# Patient Record
Sex: FEMALE | Race: WHITE | Employment: FULL TIME | URBAN - METROPOLITAN AREA
[De-identification: names, ages, dates, MRNs, and addresses within clinical notes are randomized per-mention and may not be internally consistent; named-entity substitution may affect disease eponyms.]

---

## 2020-04-28 ENCOUNTER — HOSPITAL ENCOUNTER (OUTPATIENT)
Age: 60
Setting detail: SPECIMEN
Discharge: HOME | End: 2020-04-28

## 2020-04-28 PROCEDURE — 86481 TB AG RESPONSE T-CELL SUSP: CPT

## 2020-04-30 LAB
NIL (NEGATIVE) SPOT CONTROL: NORMAL
PANEL A SPOT COUNT: 0
PANEL B SPOT COUNT: 0
POSITIVE CONTROL SPOT COUNT: NORMAL
TB CELL IMMUNE MEASURE: NORMAL

## 2020-09-30 ENCOUNTER — TELEPHONE (OUTPATIENT)
Dept: FAMILY MEDICINE CLINIC | Age: 60
End: 2020-09-30

## 2020-11-23 ENCOUNTER — TELEPHONE (OUTPATIENT)
Dept: PRIMARY CARE CLINIC | Age: 60
End: 2020-11-23

## 2020-11-23 NOTE — TELEPHONE ENCOUNTER
HCTZ 25mg  Triamcinolone cream 5%  Prednisone 20mg  [facility] [address] [place], [place]  Phone: [phone]

## 2021-01-20 RX ORDER — MINOCYCLINE HYDROCHLORIDE 100 MG/1
CAPSULE ORAL
Qty: 30 CAPSULE | Refills: 4 | Status: SHIPPED | OUTPATIENT
Start: 2021-01-20 | End: 2021-06-16

## 2021-02-17 ENCOUNTER — HOSPITAL ENCOUNTER (OUTPATIENT)
Age: 61
Discharge: HOME | End: 2021-02-17
Payer: COMMERCIAL

## 2021-02-17 ENCOUNTER — HOSPITAL ENCOUNTER (OUTPATIENT)
Dept: GENERAL RADIOLOGY | Age: 61
Discharge: HOME | End: 2021-02-17
Payer: COMMERCIAL

## 2021-02-17 DIAGNOSIS — R06.02 SOB (SHORTNESS OF BREATH): ICD-10-CM

## 2021-02-17 PROCEDURE — 71046 X-RAY EXAM CHEST 2 VIEWS: CPT

## 2021-03-01 RX ORDER — TRIAMCINOLONE ACETONIDE 5 MG/G
CREAM TOPICAL
Qty: 15 G | Refills: 2 | Status: SHIPPED | OUTPATIENT
Start: 2021-03-01

## 2021-03-01 RX ORDER — PREDNISONE 20 MG/1
TABLET ORAL
Qty: 30 TABLET | Refills: 0 | Status: SHIPPED | OUTPATIENT
Start: 2021-03-01 | End: 2021-04-26

## 2021-03-05 ENCOUNTER — HOSPITAL ENCOUNTER (OUTPATIENT)
Dept: LAB | Age: 61
Discharge: HOME | End: 2021-03-05
Payer: COMMERCIAL

## 2021-03-05 PROCEDURE — U0002 COVID-19 LAB TEST NON-CDC: HCPCS

## 2021-03-05 PROCEDURE — 36415 COLL VENOUS BLD VENIPUNCTURE: CPT

## 2021-03-06 LAB
SARS-COV-2: NOT DETECTED
SOURCE: NORMAL

## 2021-03-11 ENCOUNTER — HOSPITAL ENCOUNTER (OUTPATIENT)
Dept: PULMONOLOGY | Age: 61
Discharge: HOME | End: 2021-03-11
Payer: COMMERCIAL

## 2021-03-11 LAB
DLCO %PRED: 710 %
DLCO PRED: NORMAL
DLCO/VA %PRED: NORMAL
DLCO/VA PRED: NORMAL
DLCO/VA: NORMAL
DLCO: NORMAL
EXPIRATORY TIME-POST: NORMAL
EXPIRATORY TIME: NORMAL
FEF 25-75% %CHNG: NORMAL
FEF 25-75% %PRED-POST: NORMAL
FEF 25-75% %PRED-PRE: NORMAL
FEF 25-75% PRED: NORMAL
FEF 25-75%-POST: NORMAL
FEF 25-75%-PRE: NORMAL
FEV1 %PRED-POST: 61 %
FEV1 %PRED-PRE: 57 %
FEV1 PRED: NORMAL
FEV1-POST: NORMAL
FEV1-PRE: NORMAL
FEV1/FVC %PRED-POST: NORMAL
FEV1/FVC %PRED-PRE: NORMAL
FEV1/FVC PRED: NORMAL
FEV1/FVC-POST: 99 %
FEV1/FVC-PRE: 95 %
FVC %PRED-POST: NORMAL
FVC %PRED-PRE: NORMAL
FVC PRED: NORMAL
FVC-POST: NORMAL
FVC-PRE: NORMAL
GAW %PRED: NORMAL
GAW PRED: NORMAL
GAW: NORMAL
IC %PRED: NORMAL
IC PRED: NORMAL
IC: NORMAL
MEP: NORMAL
MIP: NORMAL
MVV %PRED-PRE: NORMAL
MVV PRED: NORMAL
MVV-PRE: NORMAL
PEF %PRED-POST: NORMAL
PEF %PRED-PRE: NORMAL
PEF PRED: NORMAL
PEF%CHNG: NORMAL
PEF-POST: NORMAL
PEF-PRE: NORMAL
RAW %PRED: NORMAL
RAW PRED: NORMAL
RAW: NORMAL
RV %PRED: NORMAL
RV PRED: NORMAL
RV: NORMAL
SVC %PRED: NORMAL
SVC PRED: NORMAL
SVC: NORMAL
TLC %PRED: 74 %
TLC PRED: NORMAL
TLC: NORMAL
VA %PRED: NORMAL
VA PRED: NORMAL
VA: NORMAL
VTG %PRED: NORMAL
VTG PRED: NORMAL
VTG: NORMAL

## 2021-03-11 PROCEDURE — 94729 DIFFUSING CAPACITY: CPT

## 2021-03-11 PROCEDURE — 94726 PLETHYSMOGRAPHY LUNG VOLUMES: CPT

## 2021-03-11 PROCEDURE — 94060 EVALUATION OF WHEEZING: CPT

## 2021-03-11 ASSESSMENT — PULMONARY FUNCTION TESTS
FEV1_PERCENT_PREDICTED_POST: 61
FEV1/FVC_PRE: 95
FEV1_PERCENT_PREDICTED_PRE: 57
FEV1/FVC_POST: 99

## 2021-03-19 ENCOUNTER — OFFICE VISIT (OUTPATIENT)
Dept: PRIMARY CARE CLINIC | Age: 61
End: 2021-03-19
Payer: COMMERCIAL

## 2021-03-19 VITALS
SYSTOLIC BLOOD PRESSURE: 122 MMHG | HEART RATE: 63 BPM | DIASTOLIC BLOOD PRESSURE: 80 MMHG | BODY MASS INDEX: 30.83 KG/M2 | WEIGHT: 174 LBS | TEMPERATURE: 97.7 F | OXYGEN SATURATION: 99 % | HEIGHT: 63 IN

## 2021-03-19 DIAGNOSIS — F34.1 DYSTHYMIA: ICD-10-CM

## 2021-03-19 DIAGNOSIS — R06.02 SHORTNESS OF BREATH: Primary | ICD-10-CM

## 2021-03-19 DIAGNOSIS — E03.9 HYPOTHYROIDISM, UNSPECIFIED TYPE: ICD-10-CM

## 2021-03-19 PROCEDURE — G8417 CALC BMI ABV UP PARAM F/U: HCPCS | Performed by: FAMILY MEDICINE

## 2021-03-19 PROCEDURE — 1036F TOBACCO NON-USER: CPT | Performed by: FAMILY MEDICINE

## 2021-03-19 PROCEDURE — G8484 FLU IMMUNIZE NO ADMIN: HCPCS | Performed by: FAMILY MEDICINE

## 2021-03-19 PROCEDURE — 3017F COLORECTAL CA SCREEN DOC REV: CPT | Performed by: FAMILY MEDICINE

## 2021-03-19 PROCEDURE — 99213 OFFICE O/P EST LOW 20 MIN: CPT | Performed by: FAMILY MEDICINE

## 2021-03-19 PROCEDURE — G8427 DOCREV CUR MEDS BY ELIG CLIN: HCPCS | Performed by: FAMILY MEDICINE

## 2021-03-19 RX ORDER — FLUTICASONE PROPIONATE AND SALMETEROL 232; 14 UG/1; UG/1
1 POWDER, METERED RESPIRATORY (INHALATION) DAILY
Qty: 1 EACH | Refills: 5 | Status: SHIPPED | OUTPATIENT
Start: 2021-03-19 | End: 2021-06-04

## 2021-03-19 RX ORDER — MELOXICAM 7.5 MG/1
TABLET ORAL
COMMUNITY

## 2021-03-19 RX ORDER — ACYCLOVIR 200 MG/1
CAPSULE ORAL
COMMUNITY

## 2021-03-19 RX ORDER — HYDROCHLOROTHIAZIDE 25 MG/1
25 TABLET ORAL DAILY
COMMUNITY
End: 2021-06-03 | Stop reason: SDUPTHER

## 2021-03-19 RX ORDER — FLUOXETINE HYDROCHLORIDE 20 MG/1
20 CAPSULE ORAL DAILY
Qty: 90 CAPSULE | Refills: 1 | Status: SHIPPED | OUTPATIENT
Start: 2021-03-19 | End: 2021-08-24

## 2021-03-19 RX ORDER — ESTRADIOL 1 MG/1
TABLET ORAL
COMMUNITY
End: 2021-04-13

## 2021-03-19 RX ORDER — HYDROXYZINE HYDROCHLORIDE 25 MG/1
TABLET, FILM COATED ORAL
COMMUNITY
End: 2021-03-19

## 2021-03-19 RX ORDER — BUDESONIDE AND FORMOTEROL FUMARATE DIHYDRATE 80; 4.5 UG/1; UG/1
AEROSOL RESPIRATORY (INHALATION)
COMMUNITY
End: 2021-03-19

## 2021-03-19 SDOH — HEALTH STABILITY: MENTAL HEALTH: HOW OFTEN DO YOU HAVE A DRINK CONTAINING ALCOHOL?: NOT ASKED

## 2021-03-19 SDOH — HEALTH STABILITY: MENTAL HEALTH: HOW MANY STANDARD DRINKS CONTAINING ALCOHOL DO YOU HAVE ON A TYPICAL DAY?: NOT ASKED

## 2021-03-19 ASSESSMENT — PATIENT HEALTH QUESTIONNAIRE - PHQ9
SUM OF ALL RESPONSES TO PHQ9 QUESTIONS 1 & 2: 0
SUM OF ALL RESPONSES TO PHQ QUESTIONS 1-9: 0
1. LITTLE INTEREST OR PLEASURE IN DOING THINGS: 0

## 2021-03-19 ASSESSMENT — ENCOUNTER SYMPTOMS: SHORTNESS OF BREATH: 1

## 2021-03-19 NOTE — PROGRESS NOTES
Subjective:      Patient ID: Earle Loyd is a 63 y.o. female. Stuck working at home  Does get out to walk  Inhaler too expensive  Lab review      Review of Systems   Constitutional: Positive for unexpected weight change. Respiratory: Positive for shortness of breath. Musculoskeletal:        H/o signif scoliosis   Neurological: Negative. Psychiatric/Behavioral: Negative. All other systems reviewed and are negative. Objective:   Physical Exam    Assessment:      1. Shortness of breath    2. Dysthymia    3. Hypothyroidism, unspecified type            Plan:      Antonio was seen today for other. Diagnoses and all orders for this visit:    Shortness of breath    Dysthymia    Hypothyroidism, unspecified type    Other orders  -     FLUoxetine (PROZAC) 20 MG capsule; Take 1 capsule by mouth daily  -     Fluticasone-Salmeterol 232-14 MCG/ACT AEPB;  Inhale 1 Cartridge into the lungs daily                Electronically signed by Maxx Rosales MD on 3/19/2021 at 1:45 PM

## 2021-04-13 ENCOUNTER — OFFICE VISIT (OUTPATIENT)
Dept: INTERNAL MEDICINE CLINIC | Age: 61
End: 2021-04-13
Payer: COMMERCIAL

## 2021-04-13 VITALS
HEIGHT: 63 IN | OXYGEN SATURATION: 99 % | SYSTOLIC BLOOD PRESSURE: 130 MMHG | HEART RATE: 77 BPM | WEIGHT: 171.2 LBS | DIASTOLIC BLOOD PRESSURE: 80 MMHG | BODY MASS INDEX: 30.33 KG/M2 | TEMPERATURE: 97.2 F

## 2021-04-13 DIAGNOSIS — F41.9 ANXIETY: ICD-10-CM

## 2021-04-13 DIAGNOSIS — E03.9 ACQUIRED HYPOTHYROIDISM: Primary | ICD-10-CM

## 2021-04-13 DIAGNOSIS — J45.20 MILD INTERMITTENT ASTHMA WITHOUT COMPLICATION: ICD-10-CM

## 2021-04-13 DIAGNOSIS — L30.9 CHRONIC DERMATITIS OF HANDS: ICD-10-CM

## 2021-04-13 DIAGNOSIS — I10 ESSENTIAL HYPERTENSION: ICD-10-CM

## 2021-04-13 PROCEDURE — 99203 OFFICE O/P NEW LOW 30 MIN: CPT | Performed by: FAMILY MEDICINE

## 2021-04-13 PROCEDURE — G8427 DOCREV CUR MEDS BY ELIG CLIN: HCPCS | Performed by: FAMILY MEDICINE

## 2021-04-13 PROCEDURE — 3017F COLORECTAL CA SCREEN DOC REV: CPT | Performed by: FAMILY MEDICINE

## 2021-04-13 PROCEDURE — 1036F TOBACCO NON-USER: CPT | Performed by: FAMILY MEDICINE

## 2021-04-13 PROCEDURE — G8417 CALC BMI ABV UP PARAM F/U: HCPCS | Performed by: FAMILY MEDICINE

## 2021-04-13 SDOH — ECONOMIC STABILITY: FOOD INSECURITY: WITHIN THE PAST 12 MONTHS, YOU WORRIED THAT YOUR FOOD WOULD RUN OUT BEFORE YOU GOT MONEY TO BUY MORE.: NOT ASKED

## 2021-04-13 SDOH — ECONOMIC STABILITY: TRANSPORTATION INSECURITY
IN THE PAST 12 MONTHS, HAS THE LACK OF TRANSPORTATION KEPT YOU FROM MEDICAL APPOINTMENTS OR FROM GETTING MEDICATIONS?: NOT ASKED

## 2021-04-13 SDOH — ECONOMIC STABILITY: TRANSPORTATION INSECURITY
IN THE PAST 12 MONTHS, HAS LACK OF TRANSPORTATION KEPT YOU FROM MEETINGS, WORK, OR FROM GETTING THINGS NEEDED FOR DAILY LIVING?: NOT ASKED

## 2021-04-13 ASSESSMENT — ENCOUNTER SYMPTOMS
NAUSEA: 0
COUGH: 0
BACK PAIN: 0
ABDOMINAL PAIN: 0
CONSTIPATION: 0
SHORTNESS OF BREATH: 0
DIARRHEA: 0
EYES NEGATIVE: 1
BLOOD IN STOOL: 0

## 2021-04-13 NOTE — PROGRESS NOTES
Sapphire Garcia  1960  63 y.o.  female    Chief Complaint   Patient presents with    New Patient         History of Present Illness  Sapphire Garcia is a 63 y.o. female who presents today for a new patient visit. Patient Active Problem List    Diagnosis Date Noted    Asthma     Anxiety     Hand dermatitis     Hypertension     Scoliosis     Acquired hypothyroidism      -Hypothyroidism and HTN-Stable  -Anxiety- On Prozac 20 mg.   -Hand dermatitis- Chronic. Pt was told it was related to anxiety, but symptoms do not improve. She admits she picks at the areas on her hands. She was put on Minocycline. Seen dermatology 5 years ago- No biopsy. Removing gluten from diet helped slightly  -Asthma- Follows with pulmonology. She is switching from Symbicort to Advair    Review of Systems   Constitutional: Negative for chills, diaphoresis and fever. HENT: Negative. Eyes: Negative. Respiratory: Negative for cough and shortness of breath. Cardiovascular: Negative for chest pain and palpitations. Gastrointestinal: Negative for abdominal pain, blood in stool, constipation, diarrhea and nausea. Genitourinary: Negative for difficulty urinating and dysuria. Musculoskeletal: Negative for back pain. Skin: Positive for rash. Neurological: Negative for dizziness, light-headedness and headaches. Psychiatric/Behavioral: Negative for dysphoric mood. The patient is nervous/anxious.         Allergies   Allergen Reactions    Black Pepper-Turmeric Shortness Of Breath    Aspirin     Codeine     Fluoride Preparations     Penicillins     Sulfa Antibiotics        Past Medical History:   Diagnosis Date    Acquired hypothyroidism     Anxiety     Arthritis     Asthma     Hand dermatitis     Hypertension     Scoliosis        Past Surgical History:   Procedure Laterality Date    HYSTERECTOMY, TOTAL ABDOMINAL      Still has 1 ovary       Family History   Problem Relation Age of Onset    Rheum Arthritis Mother  COPD Mother     Thyroid Cancer Mother     COPD Father        Social History     Tobacco Use    Smoking status: Never Smoker    Smokeless tobacco: Never Used   Substance Use Topics    Alcohol use: Not Currently    Drug use: Never       Current Outpatient Medications   Medication Sig Dispense Refill    Multiple Vitamin (MULTIVITAMIN ADULT PO) multivitamin   one daily      acyclovir (ZOVIRAX) 200 MG capsule acyclovir 200 mg capsule      Cetirizine HCl 10 MG CAPS cetirizine 10 mg capsule   Take 1 capsule every day by oral route.  hydroCHLOROthiazide (HYDRODIURIL) 25 MG tablet Take 25 mg by mouth daily      meloxicam (MOBIC) 7.5 MG tablet Take by mouth      SYMBICORT 80-4.5 MCG/ACT AERO Inhale 2 puffs into the lungs 2 times daily 1 Inhaler 5    triamcinolone (ARISTOCORT) 0.5 % cream APPLY TO AFFECTED AREA(S) DAILY 15 g 2    predniSONE (DELTASONE) 20 MG tablet TAKE ONE TABLET BY MOUTH DAILY AS NEEDED 30 tablet 0    atenolol (TENORMIN) 25 MG tablet atenolol 25 mg tablet      furosemide (LASIX) 20 MG tablet furosemide 20 mg tablet      triamcinolone (NASACORT ALLERGY 24HR) 55 MCG/ACT nasal inhaler Nasacort 55 mcg nasal spray aerosol   Take 2 sprays every day by nasal route.  montelukast (SINGULAIR) 10 MG tablet Take 10 mg by mouth nightly      albuterol sulfate HFA (VENTOLIN HFA) 108 (90 Base) MCG/ACT inhaler Inhale 2 puffs into the lungs every 6 hours as needed for Wheezing      levothyroxine (SYNTHROID) 50 MCG tablet Take 75 mcg by mouth Daily       minocycline (MINOCIN;DYNACIN) 100 MG capsule TAKE ONE CAPSULE BY MOUTH DAILY 30 capsule 4    FLUoxetine (PROZAC) 20 MG capsule Take 1 capsule by mouth daily 90 capsule 1    Fluticasone-Salmeterol 232-14 MCG/ACT AEPB Inhale 1 Cartridge into the lungs daily (Patient not taking: Reported on 4/13/2021) 1 each 5     No current facility-administered medications for this visit.         OBJECTIVE:    /80   Pulse 77   Temp 97.2 °F (36.2 °C)   Ht 5' 3\" (1.6 m)   Wt 171 lb 3.2 oz (77.7 kg)   SpO2 99%   PF 99 L/min   BMI 30.33 kg/m²     Physical Exam  Vitals signs reviewed. Constitutional:       General: She is not in acute distress. HENT:      Head: Normocephalic and atraumatic. Right Ear: Tympanic membrane normal.      Left Ear: Tympanic membrane normal.   Eyes:      Extraocular Movements: Extraocular movements intact. Pupils: Pupils are equal, round, and reactive to light. Neck:      Musculoskeletal: Neck supple. Cardiovascular:      Rate and Rhythm: Normal rate and regular rhythm. Pulmonary:      Effort: Pulmonary effort is normal. No respiratory distress. Breath sounds: Normal breath sounds. Abdominal:      General: Bowel sounds are normal. There is no distension. Palpations: Abdomen is soft. Tenderness: There is no abdominal tenderness. Musculoskeletal:      Right lower leg: No edema. Left lower leg: No edema. Skin:     Findings: Rash (B/L excoriated areas to dorsum of hands) present. Neurological:      Mental Status: She is alert and oriented to person, place, and time. Cranial Nerves: No cranial nerve deficit. Psychiatric:         Mood and Affect: Mood normal.         ASSESSMENT:  1. Acquired hypothyroidism    2. Chronic dermatitis of hands    3. Essential hypertension    4. Anxiety    5.  Mild intermittent asthma without complication        PLAN:    Orders Placed This Encounter   Procedures    BASIC METABOLIC PANEL    Amb External Referral To Dermatology   Obtain old records/[facility] labs  Obtain lab  Continue medications  Avoid picking at hands  Refer to [address] [place] [address] or call           Return in about 3 months (around 7/13/2021) for Physical.    Electronically Signed by Lisa Ceron DO

## 2021-04-26 RX ORDER — MELOXICAM 15 MG/1
TABLET ORAL
Qty: 30 TABLET | Refills: 4 | Status: SHIPPED | OUTPATIENT
Start: 2021-04-26 | End: 2021-07-13 | Stop reason: SDUPTHER

## 2021-04-26 RX ORDER — PREDNISONE 20 MG/1
TABLET ORAL
Qty: 30 TABLET | Refills: 0 | Status: SHIPPED | OUTPATIENT
Start: 2021-04-26 | End: 2021-06-03 | Stop reason: SDUPTHER

## 2021-04-26 RX ORDER — FUROSEMIDE 20 MG/1
TABLET ORAL
Qty: 30 TABLET | Refills: 4 | Status: SHIPPED | OUTPATIENT
Start: 2021-04-26 | End: 2021-09-29

## 2021-04-26 RX ORDER — ATENOLOL 25 MG/1
TABLET ORAL
Qty: 30 TABLET | Refills: 4 | Status: SHIPPED | OUTPATIENT
Start: 2021-04-26 | End: 2021-09-29

## 2021-04-26 RX ORDER — FLUOXETINE 10 MG/1
CAPSULE ORAL
Qty: 30 CAPSULE | Refills: 9 | Status: SHIPPED | OUTPATIENT
Start: 2021-04-26 | End: 2021-08-24

## 2021-05-07 RX ORDER — MONTELUKAST SODIUM 10 MG/1
TABLET ORAL
Qty: 30 TABLET | Refills: 4 | Status: SHIPPED | OUTPATIENT
Start: 2021-05-07 | End: 2021-10-04

## 2021-06-04 RX ORDER — DILTIAZEM HYDROCHLORIDE 60 MG/1
TABLET, FILM COATED ORAL
Qty: 10.2 G | Refills: 1 | Status: SHIPPED | OUTPATIENT
Start: 2021-06-04 | End: 2021-06-04

## 2021-06-04 RX ORDER — LEVOTHYROXINE SODIUM 0.05 MG/1
75 TABLET ORAL DAILY
Qty: 90 TABLET | Refills: 1 | Status: SHIPPED | OUTPATIENT
Start: 2021-06-04 | End: 2021-09-07 | Stop reason: SDUPTHER

## 2021-06-04 RX ORDER — DILTIAZEM HYDROCHLORIDE 60 MG/1
2 TABLET, FILM COATED ORAL 2 TIMES DAILY
Qty: 1 INHALER | Refills: 5 | Status: SHIPPED | OUTPATIENT
Start: 2021-06-04 | End: 2022-02-01 | Stop reason: SDUPTHER

## 2021-06-04 RX ORDER — HYDROCHLOROTHIAZIDE 25 MG/1
25 TABLET ORAL DAILY
Qty: 90 TABLET | Refills: 1 | Status: SHIPPED | OUTPATIENT
Start: 2021-06-04 | End: 2021-11-08

## 2021-06-04 RX ORDER — LEVOTHYROXINE SODIUM 0.07 MG/1
TABLET ORAL
Qty: 30 TABLET | Refills: 1 | Status: SHIPPED | OUTPATIENT
Start: 2021-06-04 | End: 2021-06-04

## 2021-06-04 RX ORDER — PREDNISONE 20 MG/1
TABLET ORAL
Qty: 30 TABLET | Refills: 0 | Status: SHIPPED | OUTPATIENT
Start: 2021-06-04 | End: 2021-07-13

## 2021-06-04 RX ORDER — ALBUTEROL SULFATE 90 UG/1
2 AEROSOL, METERED RESPIRATORY (INHALATION) EVERY 6 HOURS PRN
Qty: 1 INHALER | Refills: 5 | Status: SHIPPED | OUTPATIENT
Start: 2021-06-04

## 2021-06-14 ENCOUNTER — TELEPHONE (OUTPATIENT)
Dept: PRIMARY CARE CLINIC | Age: 61
End: 2021-06-14

## 2021-06-14 NOTE — TELEPHONE ENCOUNTER
STEPHANY CALLING BECAUSE NAEL WAS ONLY PRESCRIBED 90 TABS OF HER THYROID MEDICATION WHEN SHE TAKES 1.5 TAB DAILY.  QUANTITY  WAS ADJUSTED

## 2021-06-16 RX ORDER — MINOCYCLINE HYDROCHLORIDE 100 MG/1
CAPSULE ORAL
Qty: 30 CAPSULE | Refills: 3 | Status: SHIPPED | OUTPATIENT
Start: 2021-06-16 | End: 2021-10-19 | Stop reason: SDUPTHER

## 2021-06-30 ENCOUNTER — TELEPHONE (OUTPATIENT)
Dept: PRIMARY CARE CLINIC | Age: 61
End: 2021-06-30

## 2021-06-30 NOTE — TELEPHONE ENCOUNTER
Pt returned called Dr Jaleel Guzman is PCP but has another Dr that takes care of her mammograms for her just told her to have them fax it to us.

## 2021-07-13 RX ORDER — MELOXICAM 15 MG/1
TABLET ORAL
Qty: 30 TABLET | Refills: 5 | Status: SHIPPED | OUTPATIENT
Start: 2021-07-13

## 2021-07-13 RX ORDER — PREDNISONE 20 MG/1
TABLET ORAL
Qty: 30 TABLET | Refills: 0 | Status: SHIPPED | OUTPATIENT
Start: 2021-07-13 | End: 2021-10-06

## 2021-08-03 ENCOUNTER — TELEPHONE (OUTPATIENT)
Dept: PRIMARY CARE CLINIC | Age: 61
End: 2021-08-03

## 2021-08-03 RX ORDER — VALACYCLOVIR HYDROCHLORIDE 500 MG/1
500 TABLET, FILM COATED ORAL 2 TIMES DAILY
COMMUNITY
End: 2021-09-07 | Stop reason: SDUPTHER

## 2021-08-24 RX ORDER — FLUOXETINE HYDROCHLORIDE 20 MG/1
CAPSULE ORAL
Qty: 90 CAPSULE | Refills: 1 | Status: SHIPPED | OUTPATIENT
Start: 2021-08-24 | End: 2022-01-27 | Stop reason: SDUPTHER

## 2021-09-07 ENCOUNTER — OFFICE VISIT (OUTPATIENT)
Dept: PRIMARY CARE CLINIC | Age: 61
End: 2021-09-07
Payer: COMMERCIAL

## 2021-09-07 ENCOUNTER — HOSPITAL ENCOUNTER (OUTPATIENT)
Age: 61
Setting detail: SPECIMEN
Discharge: HOME | End: 2021-09-07
Payer: COMMERCIAL

## 2021-09-07 VITALS
SYSTOLIC BLOOD PRESSURE: 130 MMHG | DIASTOLIC BLOOD PRESSURE: 85 MMHG | OXYGEN SATURATION: 98 % | BODY MASS INDEX: 30.76 KG/M2 | HEART RATE: 72 BPM | WEIGHT: 173.6 LBS | HEIGHT: 63 IN

## 2021-09-07 DIAGNOSIS — R53.83 FATIGUE, UNSPECIFIED TYPE: ICD-10-CM

## 2021-09-07 DIAGNOSIS — R13.10 DYSPHAGIA, UNSPECIFIED TYPE: Primary | ICD-10-CM

## 2021-09-07 DIAGNOSIS — Z12.31 ENCOUNTER FOR SCREENING MAMMOGRAM FOR MALIGNANT NEOPLASM OF BREAST: ICD-10-CM

## 2021-09-07 DIAGNOSIS — I10 ESSENTIAL HYPERTENSION: ICD-10-CM

## 2021-09-07 LAB
ABSOLUTE EOS #: 0.23 K/UL (ref 0–0.44)
ABSOLUTE IMMATURE GRANULOCYTE: <0.03 K/UL (ref 0–0.3)
ABSOLUTE LYMPH #: 2.09 K/UL (ref 1.1–3.7)
ABSOLUTE MONO #: 0.53 K/UL (ref 0.1–1.2)
ANION GAP SERPL CALCULATED.3IONS-SCNC: 16 MMOL/L (ref 9–17)
BASOPHILS # BLD: 1 % (ref 0–2)
BASOPHILS ABSOLUTE: 0.04 K/UL (ref 0–0.2)
BUN BLDV-MCNC: 20 MG/DL (ref 6–20)
CHLORIDE BLD-SCNC: 99 MMOL/L (ref 98–107)
CHOLESTEROL/HDL RATIO: 5.8
CHOLESTEROL: 305 MG/DL
CO2: 24 MMOL/L (ref 20–31)
CREAT SERPL-MCNC: 1 MG/DL (ref 0.5–0.9)
DIFFERENTIAL TYPE: NORMAL
EOSINOPHILS RELATIVE PERCENT: 3 % (ref 1–4)
GFR AFRICAN AMERICAN: >60 ML/MIN
GFR NON-AFRICAN AMERICAN: 57 ML/MIN
GFR SERPL CREATININE-BSD FRML MDRD: ABNORMAL ML/MIN/{1.73_M2}
GFR SERPL CREATININE-BSD FRML MDRD: ABNORMAL ML/MIN/{1.73_M2}
HCT VFR BLD CALC: 46 % (ref 36.3–47.1)
HDLC SERPL-MCNC: 53 MG/DL
HEMOGLOBIN: 14.2 G/DL (ref 11.9–15.1)
IMMATURE GRANULOCYTES: 0 %
LDL CHOLESTEROL: ABNORMAL MG/DL (ref 0–130)
LYMPHOCYTES # BLD: 28 % (ref 24–43)
MCH RBC QN AUTO: 28.3 PG (ref 25.2–33.5)
MCHC RBC AUTO-ENTMCNC: 30.9 G/DL (ref 28.4–34.8)
MCV RBC AUTO: 91.8 FL (ref 82.6–102.9)
MONOCYTES # BLD: 7 % (ref 3–12)
NRBC AUTOMATED: 0 PER 100 WBC
PDW BLD-RTO: 13.9 % (ref 11.8–14.4)
PLATELET # BLD: 388 K/UL (ref 138–453)
PLATELET ESTIMATE: NORMAL
PMV BLD AUTO: 9.8 FL (ref 8.1–13.5)
POTASSIUM SERPL-SCNC: 4.5 MMOL/L (ref 3.7–5.3)
RBC # BLD: 5.01 M/UL (ref 3.95–5.11)
RBC # BLD: NORMAL 10*6/UL
SEG NEUTROPHILS: 61 % (ref 36–65)
SEGMENTED NEUTROPHILS ABSOLUTE COUNT: 4.64 K/UL (ref 1.5–8.1)
SODIUM BLD-SCNC: 139 MMOL/L (ref 135–144)
TRIGL SERPL-MCNC: 402 MG/DL
TSH SERPL DL<=0.05 MIU/L-ACNC: 1.98 MIU/L (ref 0.3–5)
VLDLC SERPL CALC-MCNC: ABNORMAL MG/DL (ref 1–30)
WBC # BLD: 7.6 K/UL (ref 3.5–11.3)
WBC # BLD: NORMAL 10*3/UL

## 2021-09-07 PROCEDURE — G8427 DOCREV CUR MEDS BY ELIG CLIN: HCPCS | Performed by: FAMILY MEDICINE

## 2021-09-07 PROCEDURE — 99214 OFFICE O/P EST MOD 30 MIN: CPT | Performed by: FAMILY MEDICINE

## 2021-09-07 PROCEDURE — G8417 CALC BMI ABV UP PARAM F/U: HCPCS | Performed by: FAMILY MEDICINE

## 2021-09-07 PROCEDURE — 3017F COLORECTAL CA SCREEN DOC REV: CPT | Performed by: FAMILY MEDICINE

## 2021-09-07 PROCEDURE — 1036F TOBACCO NON-USER: CPT | Performed by: FAMILY MEDICINE

## 2021-09-07 RX ORDER — VALACYCLOVIR HYDROCHLORIDE 500 MG/1
500 TABLET, FILM COATED ORAL 2 TIMES DAILY
Qty: 30 TABLET | Refills: 0 | Status: SHIPPED | OUTPATIENT
Start: 2021-09-07 | End: 2022-01-07 | Stop reason: SDUPTHER

## 2021-09-07 RX ORDER — LEVOTHYROXINE SODIUM 0.05 MG/1
75 TABLET ORAL DAILY
Qty: 90 TABLET | Refills: 1 | Status: SHIPPED | OUTPATIENT
Start: 2021-09-07 | End: 2022-01-07 | Stop reason: SDUPTHER

## 2021-09-08 LAB — LDL CHOLESTEROL DIRECT: 193 MG/DL

## 2021-09-10 RX ORDER — ATORVASTATIN CALCIUM 10 MG/1
10 TABLET, FILM COATED ORAL DAILY
Qty: 30 TABLET | Refills: 5 | Status: SHIPPED | OUTPATIENT
Start: 2021-09-10 | End: 2022-03-16 | Stop reason: SDUPTHER

## 2021-09-10 NOTE — PROGRESS NOTES
Subjective:      Patient ID: Jean Wakefield is a 63 y.o. female. Trouble swallowing for ahile now  Needs BOP check  Needs labs because she is tired all the time      Review of Systems   Constitutional: Negative. All other systems reviewed and are negative. Objective:   Physical Exam  Vitals reviewed. Constitutional:       Appearance: Normal appearance. HENT:      Head: Normocephalic. Right Ear: Tympanic membrane normal.      Left Ear: Tympanic membrane normal.      Nose: Nose normal.      Mouth/Throat:      Mouth: Mucous membranes are dry. Eyes:      Pupils: Pupils are equal, round, and reactive to light. Cardiovascular:      Rate and Rhythm: Normal rate. Pulmonary:      Effort: Pulmonary effort is normal.      Breath sounds: Normal breath sounds. Abdominal:      General: Abdomen is flat. Musculoskeletal:         General: Normal range of motion. Cervical back: Normal range of motion. Skin:     General: Skin is warm and dry. Neurological:      General: No focal deficit present. Psychiatric:         Mood and Affect: Mood normal.         Thought Content: Thought content normal.         Judgment: Judgment normal.         Assessment:      1. Dysphagia, unspecified type    2. Encounter for screening mammogram for malignant neoplasm of breast    3. Essential hypertension    4. Fatigue, unspecified type            Plan:      Tony Britton was seen today for pharyngitis, weight loss and other. Diagnoses and all orders for this visit:    Dysphagia, unspecified type  -     External Referral To ENT  -     Cologuard (For External Results Only); Future    Encounter for screening mammogram for malignant neoplasm of breast  -     HANNA DIGITAL SCREEN W OR WO CAD BILATERAL; Future    Essential hypertension  -     BUN & Creatinine; Future  -     Electrolyte Panel; Future  -     Lipid Panel; Future    Fatigue, unspecified type  -     TSH;  Future  -     CBC With Auto Differential; Future    Other orders  -     levothyroxine (SYNTHROID) 50 MCG tablet; Take 1.5 tablets by mouth Daily  -     valACYclovir (VALTREX) 500 MG tablet;  Take 1 tablet by mouth 2 times daily                Electronically signed by Emery Carlson MD on 9/7/2021 at 1:14 PM

## 2021-09-29 RX ORDER — FUROSEMIDE 20 MG/1
TABLET ORAL
Qty: 30 TABLET | Refills: 4 | Status: SHIPPED | OUTPATIENT
Start: 2021-09-29 | End: 2022-03-16 | Stop reason: SDUPTHER

## 2021-09-29 RX ORDER — ATENOLOL 25 MG/1
TABLET ORAL
Qty: 30 TABLET | Refills: 4 | Status: SHIPPED | OUTPATIENT
Start: 2021-09-29 | End: 2022-03-16 | Stop reason: SDUPTHER

## 2021-10-04 RX ORDER — MONTELUKAST SODIUM 10 MG/1
TABLET ORAL
Qty: 30 TABLET | Refills: 4 | Status: SHIPPED | OUTPATIENT
Start: 2021-10-04 | End: 2022-03-16 | Stop reason: SDUPTHER

## 2021-10-04 RX ORDER — TIZANIDINE 4 MG/1
4 TABLET ORAL EVERY 6 HOURS PRN
COMMUNITY
End: 2021-10-19 | Stop reason: SDUPTHER

## 2021-10-06 RX ORDER — PREDNISONE 20 MG/1
TABLET ORAL
Qty: 30 TABLET | Refills: 0 | Status: SHIPPED | OUTPATIENT
Start: 2021-10-06 | End: 2021-12-01

## 2021-10-06 RX ORDER — TIZANIDINE 4 MG/1
4 TABLET ORAL EVERY 6 HOURS PRN
OUTPATIENT
Start: 2021-10-06

## 2021-10-19 RX ORDER — TIZANIDINE 4 MG/1
4 TABLET ORAL EVERY 6 HOURS PRN
Qty: 30 TABLET | Refills: 0 | Status: SHIPPED | OUTPATIENT
Start: 2021-10-19 | End: 2021-11-08

## 2021-10-19 RX ORDER — MINOCYCLINE HYDROCHLORIDE 100 MG/1
100 CAPSULE ORAL DAILY
Qty: 30 CAPSULE | Refills: 0 | Status: SHIPPED | OUTPATIENT
Start: 2021-10-19 | End: 2021-12-01

## 2021-11-03 ENCOUNTER — OFFICE VISIT (OUTPATIENT)
Dept: ORTHOPEDIC SURGERY | Age: 61
End: 2021-11-03
Payer: COMMERCIAL

## 2021-11-03 VITALS
HEART RATE: 69 BPM | BODY MASS INDEX: 30.65 KG/M2 | HEIGHT: 63 IN | WEIGHT: 173 LBS | OXYGEN SATURATION: 99 % | RESPIRATION RATE: 14 BRPM

## 2021-11-03 DIAGNOSIS — S46.212A STRAIN OF LEFT BICEPS, INITIAL ENCOUNTER: Primary | ICD-10-CM

## 2021-11-03 PROCEDURE — 1036F TOBACCO NON-USER: CPT | Performed by: STUDENT IN AN ORGANIZED HEALTH CARE EDUCATION/TRAINING PROGRAM

## 2021-11-03 PROCEDURE — 99203 OFFICE O/P NEW LOW 30 MIN: CPT | Performed by: STUDENT IN AN ORGANIZED HEALTH CARE EDUCATION/TRAINING PROGRAM

## 2021-11-03 PROCEDURE — G8484 FLU IMMUNIZE NO ADMIN: HCPCS | Performed by: STUDENT IN AN ORGANIZED HEALTH CARE EDUCATION/TRAINING PROGRAM

## 2021-11-03 PROCEDURE — G8427 DOCREV CUR MEDS BY ELIG CLIN: HCPCS | Performed by: STUDENT IN AN ORGANIZED HEALTH CARE EDUCATION/TRAINING PROGRAM

## 2021-11-03 PROCEDURE — G8417 CALC BMI ABV UP PARAM F/U: HCPCS | Performed by: STUDENT IN AN ORGANIZED HEALTH CARE EDUCATION/TRAINING PROGRAM

## 2021-11-03 PROCEDURE — 3017F COLORECTAL CA SCREEN DOC REV: CPT | Performed by: STUDENT IN AN ORGANIZED HEALTH CARE EDUCATION/TRAINING PROGRAM

## 2021-11-03 ASSESSMENT — ENCOUNTER SYMPTOMS
FACIAL SWELLING: 0
EYE PAIN: 0
EYE REDNESS: 0
COUGH: 0
ABDOMINAL PAIN: 0
SHORTNESS OF BREATH: 0
VOMITING: 0
COLOR CHANGE: 1
NAUSEA: 0
WHEEZING: 0
STRIDOR: 0
PHOTOPHOBIA: 0
BACK PAIN: 0

## 2021-11-03 NOTE — PATIENT INSTRUCTIONS
No heavy lifting, pushing, or pulling  Continue to work on range of motion  Ice and elevate as needed  Tylenol or Motrin for pain  Follow up in 4 weeks

## 2021-11-03 NOTE — PROGRESS NOTES
11/3/2021   Chief Complaint   Patient presents with    Arm Pain     left mid-humeral pain        History of Present Illness:                             Librado Hugo is a 63 y.o. female right handed patient referred by self for evaluation and treatment left biceps muscle belly pain. This is evaluated as a personal injury. Patient states that approximately 2 weeks prior she struck a metal fence post with the midportion of her left arm. She had immediate pain and swelling over the biceps muscle belly in this area. Over the following several days she had significant ecchymosis in the area. She was able to continue using the arm although she does have some pain with supination. She was not planning on seeking treatment but due to previous injuries in the left shoulder she was encouraged by her family to be seen and evaluated. She has no other complaints at this time. She has been taking a muscle relaxer as well as Mobic for the pain states that these do provide moderate pain relief. She is here today for first visit with me. The pain occurs every day and when active. Location of pain is left biceps muscle belly at the midportion of the humerus. No history of shoulder separation, subluxation or dislocation. No known prior left biceps or humerus injury. Symptoms are aggravated by ADL's, repetitive use, work at or above shoulder height or behind body, difficulty sleeping on affected side. Symptoms are diminished by rest, avoiding the painful activities. Limited activities include: lifting, repetitive use, work at or above shoulder height or behind body, difficulty sleeping, difficulty with ADLs. No stiffness is reported. Related history: negative for prior surgery, trauma, arthritis or disorders. Is affecting ADLs. Pain is 7/10 at it's worst.    Outside reports reviewed: none. MA HPI: Patient is in the office with left mid arm pain in the humerus.  Patient states that she fell into a metal fence post around 10/17/2021 and might have injured the arm. Patient states that on the 23rd the bruising was increased when she was trying to lift a 40 pound of bird seed. Patient state eliseo murrieta has been taking a muscle relaxer, Mobic, and Tylenol. Patient's medications, allergies, past medical, surgical, social and family histories were reviewed and updated as appropriate. Patient has not previously attempted CSI, PT, NSAIDs for pain relief    Medical History  Patient's medications, allergies, past medical, surgical, social and family histories were reviewed and updated as appropriate. Past Medical History:   Diagnosis Date    Acquired hypothyroidism     Anxiety     Arthritis     Asthma     Hand dermatitis     Hypertension     Scoliosis      Past Surgical History:   Procedure Laterality Date    HYSTERECTOMY, TOTAL ABDOMINAL      Still has 1 ovary     Family History   Problem Relation Age of Onset    Rheum Arthritis Mother     COPD Mother     Thyroid Cancer Mother     COPD Father      Social History     Socioeconomic History    Marital status: Single     Spouse name: Not on file    Number of children: 2    Years of education: Not on file    Highest education level: Not on file   Occupational History    Occupation: Works [employer]   Tobacco Use    Smoking status: Never Smoker    Smokeless tobacco: Never Used   Vaping Use    Vaping Use: Never used   Substance and Sexual Activity    Alcohol use: Not Currently    Drug use: Never    Sexual activity: Not on file   Other Topics Concern    Not on file   Social History Narrative    Not on file     Social Determinants of Health     Financial Resource Strain:     Difficulty of Paying Living Expenses:    Food Insecurity:     Worried About Running Out of Food in the Last Year:     [address] in the Last Year:    Transportation Needs:     Lack of Transportation (Medical):      Lack of Transportation (Non-Medical): Physical Activity:     Days of Exercise per Week:     Minutes of Exercise per Session:    Stress:     Feeling of Stress :    Social Connections:     Frequency of Communication with Friends and Family:     Frequency of Social Gatherings with Friends and Family:     Attends Buddhist Services:     Active Member of Clubs or Organizations:     Attends Club or Organization Meetings:     Marital Status:    Intimate Partner Violence:     Fear of Current or Ex-Partner:     Emotionally Abused:     Physically Abused:     Sexually Abused:      Current Outpatient Medications   Medication Sig Dispense Refill    tiZANidine (ZANAFLEX) 4 MG tablet Take 1 tablet by mouth every 6 hours as needed (spasm) 30 tablet 0    minocycline (MINOCIN;DYNACIN) 100 MG capsule Take 1 capsule by mouth daily 30 capsule 0    predniSONE (DELTASONE) 20 MG tablet TAKE ONE TABLET BY MOUTH DAILY AS NEEDED 30 tablet 0    montelukast (SINGULAIR) 10 MG tablet TAKE ONE TABLET BY MOUTH DAILY 30 tablet 4    atenolol (TENORMIN) 25 MG tablet TAKE 1/2 TO 1 TABLET BY MOUTH EVERY NIGHT AT BEDTIME 30 tablet 4    furosemide (LASIX) 20 MG tablet TAKE ONE TABLET BY MOUTH EVERY MORNING 30 tablet 4    atorvastatin (LIPITOR) 10 MG tablet Take 1 tablet by mouth daily 30 tablet 5    levothyroxine (SYNTHROID) 50 MCG tablet Take 1.5 tablets by mouth Daily 90 tablet 1    valACYclovir (VALTREX) 500 MG tablet Take 1 tablet by mouth 2 times daily 30 tablet 0    FLUoxetine (PROZAC) 20 MG capsule TAKE ONE CAPSULE BY MOUTH DAILY 90 capsule 1    meloxicam (MOBIC) 15 MG tablet TAKE ONE TABLET BY MOUTH DAILY 30 tablet 5    SYMBICORT 80-4.5 MCG/ACT AERO Inhale 2 puffs into the lungs 2 times daily 1 Inhaler 5    hydroCHLOROthiazide (HYDRODIURIL) 25 MG tablet Take 1 tablet by mouth daily 90 tablet 1    albuterol sulfate HFA (VENTOLIN HFA) 108 (90 Base) MCG/ACT inhaler Inhale 2 puffs into the lungs every 6 hours as needed for Wheezing 1 Inhaler 5    Multiple Vitamin (MULTIVITAMIN ADULT PO) multivitamin   one daily      acyclovir (ZOVIRAX) 200 MG capsule acyclovir 200 mg capsule      Cetirizine HCl 10 MG CAPS cetirizine 10 mg capsule   Take 1 capsule every day by oral route.  meloxicam (MOBIC) 7.5 MG tablet Take by mouth (Patient not taking: Reported on 9/7/2021)      triamcinolone (ARISTOCORT) 0.5 % cream APPLY TO AFFECTED AREA(S) DAILY (Patient not taking: Reported on 9/7/2021) 15 g 2    triamcinolone (NASACORT ALLERGY 24HR) 55 MCG/ACT nasal inhaler Nasacort 55 mcg nasal spray aerosol   Take 2 sprays every day by nasal route. No current facility-administered medications for this visit. Allergies   Allergen Reactions    Black Pepper-Turmeric Shortness Of Breath    Aspirin     Codeine     Fluoride Preparations     Penicillins     Piper     Sulfa Antibiotics          Review of Systems   Constitutional: Negative for activity change, appetite change, chills, diaphoresis, fatigue, fever and unexpected weight change. HENT: Negative for congestion, ear pain, facial swelling, hearing loss and sneezing. Eyes: Negative for photophobia, pain and redness. Respiratory: Negative for cough, shortness of breath, wheezing and stridor. Cardiovascular: Negative for chest pain, palpitations and leg swelling. Gastrointestinal: Negative for abdominal pain, nausea and vomiting. Endocrine: Negative for cold intolerance and heat intolerance. Musculoskeletal: Positive for myalgias. Negative for arthralgias, back pain, gait problem, joint swelling, neck pain and neck stiffness. Skin: Positive for color change. Negative for pallor, rash and wound. Neurological: Negative for dizziness, facial asymmetry, weakness and numbness. Examination:  General Exam:  Vitals: There were no vitals taken for this visit. Physical Exam  Constitutional:       Appearance: Normal appearance. She is normal weight.    HENT: Head: Normocephalic and atraumatic. Nose: Nose normal.   Eyes:      General:         Right eye: No discharge. Left eye: No discharge. Extraocular Movements: Extraocular movements intact. Cardiovascular:      Pulses: Normal pulses. Pulmonary:      Effort: Pulmonary effort is normal.      Breath sounds: Normal breath sounds. Musculoskeletal:         General: Swelling, tenderness and signs of injury present. No deformity. Right shoulder: Normal.      Left shoulder: Normal. No swelling, deformity, effusion, laceration, tenderness, bony tenderness or crepitus. Normal range of motion. Normal strength. Normal pulse. Left upper arm: Swelling, tenderness and bony tenderness present. No edema, deformity or lacerations. Right elbow: Normal.      Left elbow: Normal.      Right forearm: Normal.      Left forearm: Normal.      Right wrist: Normal.      Left wrist: Normal.      Cervical back: Normal and normal range of motion. No rigidity or tenderness. Skin:     General: Skin is warm and dry. Capillary Refill: Capillary refill takes less than 2 seconds. Findings: Bruising present. Neurological:      General: No focal deficit present. Mental Status: She is alert and oriented to person, place, and time. LEFT SHOULDER / UPPER EXTREMITY EXAM      OBSERVATION:      Swelling: Moderate over biceps muscle belly, with significant ecchymosis    deformity: none    Discoloration: none   Scapular winging: none    Scars: none    Atrophy: none      TENDERNESS / CREPITUS (T/C):      Clavicle -/-    SUPRAspinatus  -/-     AC Jt. -/-    INFRAspinatus -/-     SC Jt. -/-    Deltoid -/-     G.  Tuberosity -/-   LH BICEP groove -/-    Acromion: -/-    Midline Neck -/-     Scapular Spine -/-   Trapezium -/-    SMA Scapula -/-   GH jt. line - post -/-     Scapulothoracic -/-   GH jt. line - ant -/-     Significant tenderness to palpation over the biceps muscle belly is well is midshaft humerus      ROM: (* = with pain)  Left shoulder   Right shoulder     AROM (PROM)  AROM (PROM)    FE   170° (175°)    170° (175°)     ER 0°   XD° (65°)   60° (65°)     ER 90° ABD  90° (90°)   90°  (90°)    IR 90° ABD  N/A (40°)   NA  (40°)     IR (spine) L3   T10      STRENGTH: (* = with pain) Left shoulder   Right shoulder    SCAPTION   5/5    5/5     IR    5/5    5/5     ER    5/5    5/5     BICEPS   5 -/5 *   5/5     Deltoid   5/5    5/5       SIGNS:  LUE     NEER: neg    OPRASADS: neg      SAHU: neg  SPEEDS: neg     DROP ARM: neg  BELLY PRESS: neg    LIFT-OFF: neg  Superior escape: none     X-Body ADD: neg   MOVING VALGUS: neg     CRANK: neg   Bear Hug: neg    Biceps stretch test: Positive      EXTREMITY NEURO-VASCULAR EXAM:     Sensation grossly intact to light touch all dermatomal regions. DTR 2+ Biceps, Triceps, BR and Negative Cindis sign    Grossly intact motor function at Elbow, Wrist and Hand    Distal pulses radial and ulnar 2+, brisk cap refill, symmetric. NECK:  Painless FROM and spinous processes non-tender. Negative Spurlings sign. Diagnostic testing:  X-ray images were reviewed by myself and discussed with the patient:  2 view of the left humerus in a skeletally mature patient shows no acute osseous abnormalities. Alignment is appropriate. No issues related to the glenohumeral or elbow joint. No sign of any soft tissue avulsion type injury. No osseous lesions. Soft tissue swelling seen at midshaft of humerus. Office Procedures:  No orders of the defined types were placed in this encounter. Assessment and Plan    A: Left biceps contusion    P:   I had a thorough discussion with the patient regarding her left upper extremity symptoms. At this time I explained that there is no concern for any type of significant biceps tearing but she is contused the muscle and may have some tearing of the muscle belly which does not require any type of operative treatment.

## 2021-11-03 NOTE — PROGRESS NOTES
Patient is in the office with left mid arm pain in the humerus. Patient states that she fell into a metal fence post around 10/17/2021 and might have injured the arm. Patient states that on the 23rd the bruising was increased when she was trying to lift a 40 pound of bird seed. Patient state eliseo murrieta has been taking a muscle relaxer, Mobic, and Tylenol.

## 2021-11-08 RX ORDER — HYDROCHLOROTHIAZIDE 25 MG/1
TABLET ORAL
Qty: 90 TABLET | Refills: 1 | Status: SHIPPED | OUTPATIENT
Start: 2021-11-08 | End: 2021-12-01

## 2021-11-08 RX ORDER — TIZANIDINE 4 MG/1
TABLET ORAL
Qty: 30 TABLET | Refills: 0 | Status: SHIPPED | OUTPATIENT
Start: 2021-11-08 | End: 2022-01-27 | Stop reason: SDUPTHER

## 2021-11-24 DIAGNOSIS — R13.10 DYSPHAGIA, UNSPECIFIED TYPE: ICD-10-CM

## 2021-11-30 ENCOUNTER — OFFICE VISIT (OUTPATIENT)
Dept: ORTHOPEDIC SURGERY | Age: 61
End: 2021-11-30
Payer: COMMERCIAL

## 2021-11-30 VITALS
HEART RATE: 66 BPM | OXYGEN SATURATION: 98 % | RESPIRATION RATE: 16 BRPM | WEIGHT: 165 LBS | BODY MASS INDEX: 29.23 KG/M2 | HEIGHT: 63 IN

## 2021-11-30 DIAGNOSIS — M75.82 ROTATOR CUFF TENDONITIS, LEFT: Primary | ICD-10-CM

## 2021-11-30 PROCEDURE — 99213 OFFICE O/P EST LOW 20 MIN: CPT | Performed by: STUDENT IN AN ORGANIZED HEALTH CARE EDUCATION/TRAINING PROGRAM

## 2021-11-30 PROCEDURE — G8427 DOCREV CUR MEDS BY ELIG CLIN: HCPCS | Performed by: STUDENT IN AN ORGANIZED HEALTH CARE EDUCATION/TRAINING PROGRAM

## 2021-11-30 PROCEDURE — 3017F COLORECTAL CA SCREEN DOC REV: CPT | Performed by: STUDENT IN AN ORGANIZED HEALTH CARE EDUCATION/TRAINING PROGRAM

## 2021-11-30 PROCEDURE — G8417 CALC BMI ABV UP PARAM F/U: HCPCS | Performed by: STUDENT IN AN ORGANIZED HEALTH CARE EDUCATION/TRAINING PROGRAM

## 2021-11-30 PROCEDURE — G8484 FLU IMMUNIZE NO ADMIN: HCPCS | Performed by: STUDENT IN AN ORGANIZED HEALTH CARE EDUCATION/TRAINING PROGRAM

## 2021-11-30 PROCEDURE — 1036F TOBACCO NON-USER: CPT | Performed by: STUDENT IN AN ORGANIZED HEALTH CARE EDUCATION/TRAINING PROGRAM

## 2021-11-30 ASSESSMENT — ENCOUNTER SYMPTOMS
PHOTOPHOBIA: 0
BACK PAIN: 0
NAUSEA: 0
STRIDOR: 0
FACIAL SWELLING: 0
EYE PAIN: 0
WHEEZING: 0
EYE REDNESS: 0
VOMITING: 0
SHORTNESS OF BREATH: 0
COUGH: 0
ABDOMINAL PAIN: 0

## 2021-11-30 NOTE — PATIENT INSTRUCTIONS
Continue range of motion  Ice and elevate as needed  Tylenol or Motrin for pain  Injection given today in the office   Rest for 24-48 hours  Follow up in 3 months  Physical therapy ordered today.

## 2021-11-30 NOTE — PROGRESS NOTES
11/30/2021   Chief Complaint   Patient presents with    Leg Pain     left humerus pain        Update HPI: Patient is here for reevaluation of her left humerus. She states the pain over the biceps has significantly improved since last being seen but she states that she has increasing pain at the rotator cuff insertion of the left shoulder. She especially has pain with overhead activities. She states that she feels that she has been using the arm differently trying to avoid using the biceps over the last several weeks since being seen. She has no new injury or complaint otherwise. Previous HPI (11-3-2021  ):                    Davonte Monzon is a 63 y.o. female right handed patient referred by self for evaluation and treatment left biceps muscle belly pain. This is evaluated as a personal injury. Patient states that approximately 2 weeks prior she struck a metal fence post with the midportion of her left arm. She had immediate pain and swelling over the biceps muscle belly in this area. Over the following several days she had significant ecchymosis in the area. She was able to continue using the arm although she does have some pain with supination. She was not planning on seeking treatment but due to previous injuries in the left shoulder she was encouraged by her family to be seen and evaluated. She has no other complaints at this time. She has been taking a muscle relaxer as well as Mobic for the pain states that these do provide moderate pain relief. She is here today for first visit with me. The pain occurs every day and when active. Location of pain is left biceps muscle belly at the midportion of the humerus. No history of shoulder separation, subluxation or dislocation. No known prior left biceps or humerus injury. Symptoms are aggravated by ADL's, repetitive use, work at or above shoulder height or behind body, difficulty sleeping on affected side.  Symptoms are diminished by rest, avoiding the painful activities. Limited activities include: lifting, repetitive use, work at or above shoulder height or behind body, difficulty sleeping, difficulty with ADLs. No stiffness is reported. Related history: negative for prior surgery, trauma, arthritis or disorders. Is affecting ADLs. Pain is 7/10 at it's worst.    Outside reports reviewed: none. MA HPI: Patient is in the office with left mid arm pain in the humerus. Patient states that she fell into a metal fence post around 10/17/2021 and might have injured the arm. Patient states that on the 23rd the bruising was increased when she was trying to lift a 40 pound of bird seed. Patient state eliseo murrieta has been taking a muscle relaxer, Mobic, and Tylenol. Patient's medications, allergies, past medical, surgical, social and family histories were reviewed and updated as appropriate. Patient has not previously attempted CSI, PT, NSAIDs for pain relief    Medical History  Patient's medications, allergies, past medical, surgical, social and family histories were reviewed and updated as appropriate.     Past Medical History:   Diagnosis Date    Acquired hypothyroidism     Anxiety     Arthritis     Asthma     Hand dermatitis     Hypertension     Scoliosis      Past Surgical History:   Procedure Laterality Date    HYSTERECTOMY, TOTAL ABDOMINAL      Still has 1 ovary     Family History   Problem Relation Age of Onset    Rheum Arthritis Mother     COPD Mother     Thyroid Cancer Mother     COPD Father      Social History     Socioeconomic History    Marital status: Single     Spouse name: None    Number of children: 2    Years of education: None    Highest education level: None   Occupational History    Occupation: Works [employer]   Tobacco Use    Smoking status: Never Smoker    Smokeless tobacco: Never Used   Vaping Use    Vaping Use: Never used   Substance and Sexual Activity    Alcohol use: Not Currently    Drug use: Never    Sexual activity: None   Other Topics Concern    None   Social History Narrative    None     Social Determinants of Health     Financial Resource Strain:     Difficulty of Paying Living Expenses: Not on file   Food Insecurity:     Worried About Running Out of Food in the Last Year: Not on file    Jeanne of Food in the Last Year: Not on file   Transportation Needs:     Lack of Transportation (Medical): Not on file    Lack of Transportation (Non-Medical):  Not on file   Physical Activity:     Days of Exercise per Week: Not on file    Minutes of Exercise per Session: Not on file   Stress:     Feeling of Stress : Not on file   Social Connections:     Frequency of Communication with Friends and Family: Not on file    Frequency of Social Gatherings with Friends and Family: Not on file    Attends Yarsani Services: Not on file    Active Member of [address] [place] [address] or Organizations: Not on file    Attends Club or Organization Meetings: Not on file    Marital Status: Not on file   Intimate Partner Violence:     Fear of Current or Ex-Partner: Not on file    Emotionally Abused: Not on file    Physically Abused: Not on file    Sexually Abused: Not on file   Housing Stability:     Unable to Pay for Housing in the Last Year: Not on file    Number of [place] in the Last Year: Not on file    Unstable Housing in the Last Year: Not on file     Current Outpatient Medications   Medication Sig Dispense Refill    tiZANidine (ZANAFLEX) 4 MG tablet TAKE ONE TABLET BY MOUTH EVERY 6 HOURS AS NEEDED FOR SPASM 30 tablet 0    hydroCHLOROthiazide (HYDRODIURIL) 25 MG tablet TAKE ONE TABLET BY MOUTH DAILY 90 tablet 1    minocycline (MINOCIN;DYNACIN) 100 MG capsule Take 1 capsule by mouth daily 30 capsule 0    predniSONE (DELTASONE) 20 MG tablet TAKE ONE TABLET BY MOUTH DAILY AS NEEDED 30 tablet 0    montelukast (SINGULAIR) 10 MG tablet TAKE ONE TABLET BY MOUTH DAILY 30 tablet 4    atenolol (TENORMIN) 25 MG tablet TAKE 1/2 TO 1 TABLET BY MOUTH EVERY NIGHT AT BEDTIME 30 tablet 4    furosemide (LASIX) 20 MG tablet TAKE ONE TABLET BY MOUTH EVERY MORNING 30 tablet 4    atorvastatin (LIPITOR) 10 MG tablet Take 1 tablet by mouth daily 30 tablet 5    valACYclovir (VALTREX) 500 MG tablet Take 1 tablet by mouth 2 times daily 30 tablet 0    FLUoxetine (PROZAC) 20 MG capsule TAKE ONE CAPSULE BY MOUTH DAILY 90 capsule 1    meloxicam (MOBIC) 15 MG tablet TAKE ONE TABLET BY MOUTH DAILY 30 tablet 5    SYMBICORT 80-4.5 MCG/ACT AERO Inhale 2 puffs into the lungs 2 times daily 1 Inhaler 5    albuterol sulfate HFA (VENTOLIN HFA) 108 (90 Base) MCG/ACT inhaler Inhale 2 puffs into the lungs every 6 hours as needed for Wheezing 1 Inhaler 5    acyclovir (ZOVIRAX) 200 MG capsule acyclovir 200 mg capsule      Cetirizine HCl 10 MG CAPS cetirizine 10 mg capsule   Take 1 capsule every day by oral route.  triamcinolone (NASACORT ALLERGY 24HR) 55 MCG/ACT nasal inhaler Nasacort 55 mcg nasal spray aerosol   Take 2 sprays every day by nasal route.  levothyroxine (SYNTHROID) 50 MCG tablet Take 1.5 tablets by mouth Daily 90 tablet 1    Multiple Vitamin (MULTIVITAMIN ADULT PO) multivitamin   one daily (Patient not taking: Reported on 11/3/2021)      meloxicam (MOBIC) 7.5 MG tablet Take by mouth       triamcinolone (ARISTOCORT) 0.5 % cream APPLY TO AFFECTED AREA(S) DAILY (Patient not taking: Reported on 11/3/2021) 15 g 2     No current facility-administered medications for this visit. Allergies   Allergen Reactions    Black Pepper-Turmeric Shortness Of Breath    Aspirin     Codeine     Fluoride Preparations     Penicillins     Piper     Sulfa Antibiotics          Review of Systems   Constitutional: Negative for activity change, appetite change, chills, diaphoresis, fatigue, fever and unexpected weight change. HENT: Negative for congestion, ear pain, facial swelling, hearing loss and sneezing.     Eyes: Negative for photophobia, pain and redness. Respiratory: Negative for cough, shortness of breath, wheezing and stridor. Cardiovascular: Negative for chest pain, palpitations and leg swelling. Gastrointestinal: Negative for abdominal pain, nausea and vomiting. Endocrine: Negative for cold intolerance and heat intolerance. Musculoskeletal: Positive for myalgias. Negative for arthralgias, back pain, gait problem, joint swelling, neck pain and neck stiffness. Skin: Negative for color change, pallor, rash and wound. Neurological: Positive for weakness. Negative for dizziness, facial asymmetry and numbness. Examination:  General Exam:  Vitals: Pulse 66   Resp 16   Ht 5' 3\" (1.6 m)   Wt 165 lb (74.8 kg)   SpO2 98%   BMI 29.23 kg/m²    Physical Exam  Constitutional:       Appearance: Normal appearance. She is normal weight. HENT:      Head: Normocephalic and atraumatic. Nose: Nose normal.   Eyes:      General:         Right eye: No discharge. Left eye: No discharge. Extraocular Movements: Extraocular movements intact. Cardiovascular:      Pulses: Normal pulses. Pulmonary:      Effort: Pulmonary effort is normal.      Breath sounds: Normal breath sounds. Musculoskeletal:         General: Tenderness and signs of injury present. No swelling or deformity. Right shoulder: Normal.      Left shoulder: Normal. No swelling, deformity, effusion, laceration, tenderness, bony tenderness or crepitus. Normal range of motion. Normal strength. Normal pulse. Left upper arm: Tenderness and bony tenderness present. No swelling, edema, deformity or lacerations. Right elbow: Normal.      Left elbow: Normal.      Right forearm: Normal.      Left forearm: Normal.      Right wrist: Normal.      Left wrist: Normal.      Cervical back: Normal and normal range of motion. No rigidity or tenderness. Skin:     General: Skin is warm and dry.       Capillary Refill: Capillary refill takes less than 2 seconds. Findings: No bruising. Neurological:      General: No focal deficit present. Mental Status: She is alert and oriented to person, place, and time. LEFT SHOULDER / UPPER EXTREMITY EXAM      OBSERVATION:      Swelling: No swelling or ecchymosis at this time  deformity: none    Discoloration: none   Scapular winging: none    Scars: none    Atrophy: none      TENDERNESS / CREPITUS (T/C):      Clavicle -/-    SUPRAspinatus  -/-     AC Jt. -/-    INFRAspinatus -/-     SC Jt. -/-    Deltoid -/-     G. Tuberosity +/-   LH BICEP groove +/-    Acromion: -/-    Midline Neck -/-     Scapular Spine -/-   Trapezium -/-    SMA Scapula -/-   GH jt. line - post -/-     Scapulothoracic -/-   GH jt. line - ant -/-         ROM: (* = with pain)  Left shoulder   Right shoulder     AROM (PROM)  AROM (PROM)    FE   140° (175°)    170° (175°)     ER 0°   60° (65°)   60° (65°)     ER 90° ABD  90° (90°)   90°  (90°)    IR 90° ABD  N/A (40°)   NA  (40°)     IR (spine) L3   T10      STRENGTH: (* = with pain) Left shoulder   Right shoulder    SCAPTION   4+/5    5/5     IR    5/5    5/5     ER    5/5    5/5     BICEPS   5 -/5 *   5/5     Deltoid   5/5    5/5       SIGNS:  LUE     NEER: Positive   OPRASADS: Positive     SAHU: Positive  SPEEDS: Positive    DROP ARM: neg  BELLY PRESS: neg    LIFT-OFF: neg  Superior escape: none     X-Body ADD: Positive  MOVING VALGUS: neg     CRANK: neg   Bear Hug: neg    Biceps stretch test: Positive      EXTREMITY NEURO-VASCULAR EXAM:     Sensation grossly intact to light touch all dermatomal regions. DTR 2+ Biceps, Triceps, BR and Negative Cindis sign    Grossly intact motor function at Elbow, Wrist and Hand    Distal pulses radial and ulnar 2+, brisk cap refill, symmetric. NECK:  Painless FROM and spinous processes non-tender. Negative Spurlings sign.         Diagnostic testing:  No new orthopedic imaging    Previous imaging:  X-ray images were reviewed by myself and discussed with the patient:  2 view of the left humerus in a skeletally mature patient shows no acute osseous abnormalities. Alignment is appropriate. No issues related to the glenohumeral or elbow joint. No sign of any soft tissue avulsion type injury. No osseous lesions. Soft tissue swelling seen at midshaft of humerus. Office Procedures:  No orders of the defined types were placed in this encounter. Right subacromial Shoulder Injection Procedure Note   Pre-operative Diagnosis: Right rotator cuff tendinitis   Post-operative Diagnosis: same   Indications: Symptomatic relief of tendinitis   Anesthesia: Lidocaine 1% and marcaine 0.5% without epinephrine without added sodium bicarbonate   Procedure Details   Verbal consent was obtained for the procedure. The shoulder was prepped with alcohol. A 22 gauge needle was advanced into the subacromial space through posterior approach without difficulty The space was then injected with 1 ml 1% lidocaine and 1 ml 0.5% marcaine and 1 ml of triamcinolone (KENALOG) 40mg/ml. The injection site was cleansed with isopropyl alcohol and a dressing was applied. Complications: None; patient tolerated the procedure well. Symptoms were improved immediately after the injection. Assessment and Plan    A: Left shoulder rotator cuff tendinitis    P:   I had a thorough discussion with the patient regarding her left shoulder pain. I explained that her biceps tendon contusion has significantly improved but she likely has developed some tendinosis as she has been using the arm differently with the biceps injury. She may have some underlying partial tearing as well but because she has had no conservative treatment done thus far, I do feel that she would be a good candidate for cortisone injection and patient agreed. She underwent a left subacromial cortisone injection with no complication and patient had immediate pain relief.   She was given formal physical therapy to work on her rotator cuff as well as her biceps. She will return in 3 months. If she had minimal improvement with injection therapy we will consider additional cortisone injection versus MRI of the shoulder. If she received no improvement from the cortisone injection, we will consider MRI of the cervical spine. With this being said, I feel that we will likely continue conservative measures or MRI of the shoulder as the patient did have immediate relief with the injection. She will continue using ice and over-the-counter anti-inflammatories for the shoulder and she is weightbearing as tolerated range of motion as tolerated left upper extremity. All questions were answered and patient voiced understanding.     Electronically signed by Gurmeet Banuelos DO on 11/30/2021 at 4:33 PM

## 2021-11-30 NOTE — PROGRESS NOTES
Patient comes in today for a follow up of a bicep strain. She was last seen in our office on 11/3/21. She states that she is doing better. Her pain level has decreased and her ROM has increased. She still has pain with flexion and abduction. She rates her pain at 2/10 currently but her pain increases with activity. Her pain is located on the bicep and posterior humerus. She denies any new falls or injuries.    Dominant hand: right  Occupation: Support Services Admin for [employer]

## 2021-12-01 PROBLEM — M75.82 ROTATOR CUFF TENDONITIS, LEFT: Status: ACTIVE | Noted: 2021-12-01

## 2021-12-01 RX ORDER — PREDNISONE 20 MG/1
TABLET ORAL
Qty: 30 TABLET | Refills: 0 | Status: SHIPPED | OUTPATIENT
Start: 2021-12-01

## 2021-12-01 RX ORDER — MINOCYCLINE HYDROCHLORIDE 100 MG/1
CAPSULE ORAL
Qty: 30 CAPSULE | Refills: 0 | Status: SHIPPED | OUTPATIENT
Start: 2021-12-01 | End: 2022-02-01 | Stop reason: SDUPTHER

## 2021-12-01 RX ORDER — HYDROCHLOROTHIAZIDE 25 MG/1
TABLET ORAL
Qty: 90 TABLET | Refills: 1 | Status: SHIPPED | OUTPATIENT
Start: 2021-12-01 | End: 2022-03-16 | Stop reason: SDUPTHER

## 2021-12-01 ASSESSMENT — ENCOUNTER SYMPTOMS: COLOR CHANGE: 0

## 2021-12-13 ENCOUNTER — HOSPITAL ENCOUNTER (OUTPATIENT)
Dept: PHYSICAL THERAPY | Age: 61
Setting detail: THERAPIES SERIES
Discharge: HOME | End: 2021-12-13
Payer: COMMERCIAL

## 2021-12-13 PROCEDURE — 97161 PT EVAL LOW COMPLEX 20 MIN: CPT

## 2021-12-13 PROCEDURE — 97110 THERAPEUTIC EXERCISES: CPT

## 2021-12-13 PROCEDURE — 97535 SELF CARE MNGMENT TRAINING: CPT

## 2021-12-13 NOTE — FLOWSHEET NOTE
Outpatient Physical Therapy  [place]           [x] Phone: [phone]   Fax: [phone]  [facility]           [] Phone: [phone]   Fax: [phone]        Physical Therapy Daily Treatment Note  Date:  2021    Patient Name:  Balaji Vital    :  1960  MRN: [id number]  Restrictions/Precautions:     Diagnosis:      Date of Injury/Surgery:   Treatment Diagnosis: Treatment Diagnosis: Rotator cuff tendonitis, left,  L shoulder pain, abnormal posture. Insurance/Certification information:   Med [employer] - [facility]  Referring Physician:  Referring Practitioner: Dr. Julissa Ball  Next Doctor Visit:    Plan of care signed (Y/N):  Pending  Outcome Measure: QuickDASH 50% impaired  Visit# / total visits:     Pain level: /10   Goals:     Patient goals : Improve use of L arm  Short term goals  Time Frame for Short term goals: 4 weeks  Short term goal 1: Patient will report compliance with HEP. Short term goal 2: Patient will report a decrease in radiculopathy symptoms in the L hand. Short term goal 3: Patient will present with improved L shoulder AROM by 10 degrees in flexion and abduction. Short term goal 4: Patient will report improved ability to don clothing. Long term goals  Time Frame for Long term goals : 8 weeks  Long term goal 1: Patient will present with an improved score on the QuickDASH from 50% impaired to 40% impaired. Long term goal 2: Patient will present with improved  L shoulder strength by 1/2 grade on MMT scale. Summary of Evaluation: Assessment: Balaji Vital is a 63year old female who was referred to physical therapy for treatment of Left rotator cuff tendonitis. Objective findings include decreased L shoulder AROM, decreased L shoulder strength, sensation deficits in the L hand, and increased tenderness and pain along the L bicep. Antonio will benefit from skilled therapeutic intervention in this setting to decrease pain and improve overall mobility.         Subjective:  See eval         Any changes in Ambulatory Summary Sheet? None        Objective:  See eval   COVID screening questions were asked and patient attested that there had been no contact or symptoms        Exercises: (No more than 4 columns)   Exercise/Equipment 12/13/21 #1 Date Date           WARM UP         UBE next           TABLE      Supine chin tuck X10, 3\" hold     Seated upper trap stretch 30\", x2     Rows with band x10  RTB                    STANDING      AROM shoulder flexion to 90 degrees next     Shoulder IR, ER with RTB next                                        PROPRIOCEPTION                                    MODALITIES                      Other Therapeutic Activities/Education:    Discussed findings from evaluation. Reviewed parameters for icing / heat for pain control. Review proper posture and importance of prevention of impingement syndrome and nerve related pain in LUE and shoulder. Home Exercise Program:  Created and reviewed      Manual Treatments:  STM to bicep      Modalities:        Communication with other providers:  POC sent for cosign on 12/13/21      Assessment:  (Response towards treatment session) (Pain Rating)    Assessment: Jesse Mcmillan is a 63year old female who was referred to physical therapy for treatment of Left rotator cuff tendonitis. Objective findings include decreased L shoulder AROM, decreased L shoulder strength, sensation deficits in the L hand, and increased tenderness and pain along the L bicep. Anirudh Vital will benefit from skilled therapeutic intervention in this setting to decrease pain and improve overall mobility.       Plan for Next Session:  Progress ROM and strengthening as tolerated      Time In / Time Out:    [id number]       If [facility] Please Indicate Time In/Out/Total Time  CPT Code Time in Time out Total Time                                                            Total for session             Timed Code/Total Treatment Minutes:  58'/58'   [place] 38', There ex 10', ADL 10'      Next Progress Note due:  10th visit      Plan of Care Interventions:  [x] Therapeutic Exercise  [x] Modalities:  [x] Therapeutic Activity     [] Ultrasound  [x] Estim  [x] Gait Training      [] Cervical Traction [] Lumbar Traction  [x] Neuromuscular Re-education    [x] Cold/hotpack [] Iontophoresis   [x] Instruction in HEP      [x] Vasopneumatic   [] Dry Needling    [x] Manual Therapy               [] Aquatic Therapy              Electronically signed by:  Viki Moulton PT, 12/13/2021, 5:54 PM

## 2021-12-13 NOTE — PROGRESS NOTES
Physical Therapy  Initial Assessment  Date: 2021  Patient Name: Sreekanth Wing  MRN: [id number]  : 1960     Treatment Diagnosis: Rotator cuff tendonitis, left,  L shoulder pain, abnormal posture. Subjective   General  Chart Reviewed: Yes  Additional Pertinent Hx: Hx of Osteoarthritis, rotator cuff tendonitis,  Referring Practitioner: Dr. Avery Gilman  Comments: L arm pain at rest 5/10 ;   with movement pain increases to 9/10. Pain initially located in bicep and now has traveled up the shoulder. She is also experiencing a stiff neck. PT Visit Information  Onset Date: 10/17/21  Subjective  Subjective: Patient states that she injured her right arm when she fell into the fencepost carrying sticks. She reports previous injury in  that tore her biceps, crushed area where nerves leave near the neck and experience of cervical radiculopathy. The numbness seems to be returning in the two fingers in the left hand, only when she remains in the same position of overuses the LUE. Social/Functional History  Social/Functional History  Lives With: Spouse  Type of Home: House  Home Layout: Two level  ADL Assistance: Independent (Some difficulty putting on/taking off a shirt or jacket. 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 Kayaking and biking. [place]. Additional Comments: CLOF:  Difficulty and pain with crocheting,  difficulty typing and completing work related tasks,   able to hold arm up for just a few minutes before the pain returns. Increased pain when moving the arm away from the body. PLOF:   Intermittent L hand numbness since , however able to functionally use the L hand.     Objective Observation/Palpation  Palpation: No increase in radiculopathy symptoms with palpation of cervical vertebrae. Some increase in radiculopathy with palpation of L upper trapezius. No increase in radiculopathy symptoms with palpation of 1st rib in thoracic outlet. Observation: Forward head, rounded shoulders. AROM LUE (degrees)  LUE AROM : Exceptions  L Shoulder Flexion 0-180: 95  L Shoulder ABduction 0-180: 116  L Shoulder Int Rotation  0-70: Level of L1  L Shoulder Ext Rotation  0-90: Level of C5  Spine  Cervical: Flexion 50    Extension 40  R rotation 55   L rotation 42   R SB 35    L SB 30    Strength LUE  Strength LUE: Exception  L Shoulder Flexion: 4-/5  L Shoulder ABduction: 4+/5  L Shoulder Internal Rotation: 4+/5  L Shoulder External Rotation: 4/5  Strength Other  Other: L  20kg       R  18 kg     Additional Measures  Special Tests: Christi Ege (+)  Niranjan Chacon (+)  Sensation  Overall Sensation Status: Impaired (Reports increased tingling in index and middle finger)           Assessment   Conditions Requiring Skilled Therapeutic Intervention  Body structures, Functions, Activity limitations: Decreased functional mobility ; Increased pain; Decreased ROM; Decreased ADL status; Decreased strength  Assessment: Balaji Vital is a 63year old female who was referred to physical therapy for treatment of Left rotator cuff tendonitis. Objective findings include decreased L shoulder AROM, decreased L shoulder strength, sensation deficits in the L hand, and increased tenderness and pain along the L bicep. Maggie Smith will benefit from skilled therapeutic intervention in this setting to decrease pain and improve overall mobility. Treatment Diagnosis: Rotator cuff tendonitis, left,  L shoulder pain, abnormal posture.   Barriers to Learning: no  Treatment Initiated : yes  Activity Tolerance  Activity Tolerance: Patient Tolerated treatment well         Plan   Plan  Times per week: 2x  Plan weeks: 8  Current Treatment Recommendations: ROM, Strengthening, IADL Training, Neuromuscular Re-education, Home Exercise Program, ADL/Self-care Training, Pain Management, Modalities, Manual Therapy - Soft Tissue Mobilization      OutComes Score QuickDASH 50% impaired    Goals  Short term goals  Time Frame for Short term goals: 4 weeks  Short term goal 1: Patient will report compliance with HEP. Short term goal 2: Patient will report a decrease in radiculopathy symptoms in the L hand. Short term goal 3: Patient will present with improved L shoulder AROM by 10 degrees in flexion and abduction. Short term goal 4: Patient will report improved ability to don clothing. Long term goals  Time Frame for Long term goals : 8 weeks  Long term goal 1: Patient will present with an improved score on the QuickDASH from 50% impaired to 40% impaired. Long term goal 2: Patient will present with improved  L shoulder strength by 1/2 grade on MMT scale.   Patient Goals   Patient goals : Improve use of L arm       Therapy Time   Individual Concurrent Group Co-treatment   Time In 1590         Time Out 1118         Minutes 58         Timed Code Treatment Minutes: 62 [address], PT

## 2021-12-13 NOTE — PLAN OF CARE
Outpatient Physical Therapy           [place]           [x] Phone: [phone]   Fax: [phone]  [place]           [] Phone: [phone]   Fax: [phone]     To: Referring Practitioner: Dr. Criselda Machado    From: Priscilla Castaneda PT, PT     Patient: Yogi Nichole       : 1960  Diagnosis:     Treatment Diagnosis: Treatment Diagnosis: Rotator cuff tendonitis, left,  L shoulder pain, abnormal posture. Date: 2021    Physical Therapy Certification/Re-Certification Form  Dear Dr. Criselda Machado  The following patient has been evaluated for physical therapy services and for therapy to continue, insurance requires physician review of the treatment plan initially and every 90 days. Please review the attached evaluation and/or summary of the patient's plan of care, and verify that you agree therapy should continue by signing the attached document and sending it back to our office. Assessment:    Assessment: Yogi Nichole is a 63year old female who was referred to physical therapy for treatment of Left rotator cuff tendonitis. Objective findings include decreased L shoulder AROM, decreased L shoulder strength, sensation deficits in the L hand, and increased tenderness and pain along the L bicep. Antonio will benefit from skilled therapeutic intervention in this setting to decrease pain and improve overall mobility.     Plan of Care/Treatment to date:  [x] Therapeutic Exercise  [x] Modalities:  [x] Therapeutic Activity     [] Ultrasound  [] Electrical Stimulation  [x] Gait Training      [] Cervical Traction [] Lumbar Traction  [x] Neuromuscular Re-education    [x] Cold/hotpack [] Iontophoresis   [x] Instruction in HEP      [x] Vasopneumatic    [] Dry Needling  [x] Manual Therapy               [] Aquatic Therapy       Other:          Frequency/Duration:  # Days per week: [x] 1 day # Weeks: [] 1 week [] 5 weeks     [x] 2 days   [] 2 weeks [] 6 weeks     [] 3 days   [] 3 weeks [] 7 weeks     [] 4 days   [] 4 weeks [x] 8 weeks         [] 9 weeks [] 10 weeks         [] 11 weeks [] 12 weeks    Rehab Potential/Progress: [] Excellent [x] Good [] Fair  [] Poor     Goals:    Patient goals : Improve use of L arm  Short term goals  Time Frame for Short term goals: 4 weeks  Short term goal 1: Patient will report compliance with HEP. Short term goal 2: Patient will report a decrease in radiculopathy symptoms in the L hand. Short term goal 3: Patient will present with improved L shoulder AROM by 10 degrees in flexion and abduction. Short term goal 4: Patient will report improved ability to don clothing. Long term goals  Time Frame for Long term goals : 8 weeks  Long term goal 1: Patient will present with an improved score on the QuickDASH from 50% impaired to 40% impaired. Long term goal 2: Patient will present with improved  L shoulder strength by 1/2 grade on MMT scale. Electronically signed by:  Rowena Cruz PT, PT, 12/13/2021, 5:53 PM        If you have any questions or concerns, please don't hesitate to call.   Thank you for your referral.      Physician Signature:________________________________Date:_________ TIME: _____  By signing above, therapists plan is approved by physician

## 2021-12-22 ENCOUNTER — HOSPITAL ENCOUNTER (OUTPATIENT)
Dept: PHYSICAL THERAPY | Age: 61
Discharge: HOME | End: 2021-12-22

## 2021-12-22 NOTE — FLOWSHEET NOTE
Physical Therapy  Cancellation/No-show Note  Patient Name:  Teri Law  :  1960   Date:  2021  Cancelled visits to date: 0  No-shows to date: 1    For today's appointment patient:  [x]  Cancelled  []  Rescheduled appointment  []  No-show     Reason given by patient:  []  Patient ill  [x]  Conflicting appointment  []  No transportation    []  Conflict with work  []  No reason given  []  Other:     Comments:       Electronically signed by:  Jane Saleh PT,

## 2022-01-07 RX ORDER — VALACYCLOVIR HYDROCHLORIDE 500 MG/1
500 TABLET, FILM COATED ORAL 2 TIMES DAILY
Qty: 30 TABLET | Refills: 0 | Status: SHIPPED | OUTPATIENT
Start: 2022-01-07

## 2022-01-07 RX ORDER — LEVOTHYROXINE SODIUM 0.05 MG/1
75 TABLET ORAL DAILY
Qty: 90 TABLET | Refills: 1 | Status: SHIPPED | OUTPATIENT
Start: 2022-01-07 | End: 2022-03-16 | Stop reason: SDUPTHER

## 2022-01-07 NOTE — TELEPHONE ENCOUNTER
Patient is on 75 mg of levothyroxine, but only 50 mg has been being sent over.     Needing refills on valtrex, zanaflex, and levothyroxine

## 2022-01-13 NOTE — DISCHARGE SUMMARY
Outpatient Physical Therapy  [place]           [x] Phone: [phone]   Fax: [phone]  [place]           [] Phone: [phone]   Fax: [phone]        Physical Therapy Daily Discharge Note  Date:  2022    Patient Name:  Davonte Monzon    :  1960  MRN: [id number]       Physical Therapy Daily Treatment Note  Date:  2021     Patient Name:  Davonte Monzon                      :  1960                       MRN: [id number]  Restrictions/Precautions:     Diagnosis:      Date of Injury/Surgery:   Treatment Diagnosis: Treatment Diagnosis: Rotator cuff tendonitis, left,  L shoulder pain, abnormal posture. Insurance/Certification information:   [employer] - [facility]  Referring Physician:  Referring Practitioner: Dr. Arely Hardin  Next Doctor Visit:    Plan of care signed (Y/N):  Pending  Outcome Measure: QuickDASH 50% impaired  Visit# / total visits:     Pain level:      /10         Goals:     Patient goals : Improve use of L arm  Short term goals  Time Frame for Short term goals: 4 weeks  Short term goal 1: Patient will report compliance with HEP. Short term goal 2: Patient will report a decrease in radiculopathy symptoms in the L hand. Short term goal 3: Patient will present with improved L shoulder AROM by 10 degrees in flexion and abduction. Short term goal 4: Patient will report improved ability to don clothing. Long term goals  Time Frame for Long term goals : 8 weeks  Long term goal 1: Patient will present with an improved score on the QuickDASH from 50% impaired to 40% impaired. Long term goal 2: Patient will present with improved  L shoulder strength by 1/2 grade on MMT scale.     Summary of Evaluation: Assessment: Davonte Monzon is a 63year old female who was referred to physical therapy for treatment of Left rotator cuff tendonitis.  Objective findings include decreased L shoulder AROM, decreased L shoulder strength, sensation deficits in the L hand, and increased tenderness and pain along the L bicep. Keri Hector will benefit from skilled therapeutic intervention in this setting to decrease pain and improve overall mobility. Objective:    Unable to complete an assessment of the patient and their progress towards their goals secondary to discontinuation of therapy. Kathy Porter last appointment was on 12/13/21. Communication with other providers:    Faxed Discharge note secondary to discontinuation of therapy sevices      Assessment:    Kathy Porter has discontinued therapy services and at this time they will be discharged from our facility. If their is any future needs please don't hesitate to call our offices and resubmit a new therapy order. We appreciate your referral and letting us serve your patients.        Interventions PRN:  [x] Therapeutic Exercise  [] Modalities:  [x] Therapeutic Activity     [] Ultrasound  [] Estim  [] Gait Training      [] Cervical Traction [] Lumbar Traction  [x] Neuromuscular Re-education    [] Cold/hotpack [] Iontophoresis   [x] Instruction in HEP      [] Vasopneumatic   [] Dry Needling    [x] Manual Therapy               [] Aquatic Therapy              Electronically signed by:    Terence Huston PT,    ,  1/13/2022, 4:11 PM

## 2022-01-18 ENCOUNTER — TELEPHONE (OUTPATIENT)
Dept: ORTHOPEDIC SURGERY | Age: 62
End: 2022-01-18

## 2022-01-18 NOTE — TELEPHONE ENCOUNTER
If the injection did not work in the shoulder then we can order an MRI of the cervical.  If the injection did work, but only received slight relief then we can order shoulder MRI.     If patient wants another steroid injection before she decides to get the MRI then she can have another steroid injection around 02/30/2022

## 2022-01-27 NOTE — TELEPHONE ENCOUNTER
Karolina Garcia calling regarding refills. I did verify with her that dr barba sent over her synthroid and her valtrex on the 7th to the pharmacy. She is also needing her prozac and her zanaflex refilled. I told her doc is out until Monday and she would be fine until then she stated.

## 2022-01-31 ENCOUNTER — HOSPITAL ENCOUNTER (OUTPATIENT)
Dept: PHYSICAL THERAPY | Age: 62
Setting detail: THERAPIES SERIES
Discharge: HOME | End: 2022-01-31
Payer: COMMERCIAL

## 2022-01-31 PROCEDURE — 97110 THERAPEUTIC EXERCISES: CPT

## 2022-01-31 PROCEDURE — 97530 THERAPEUTIC ACTIVITIES: CPT

## 2022-01-31 RX ORDER — TIZANIDINE 4 MG/1
4 TABLET ORAL EVERY 6 HOURS PRN
Qty: 30 TABLET | Refills: 0 | Status: SHIPPED | OUTPATIENT
Start: 2022-01-31 | End: 2022-03-08

## 2022-01-31 RX ORDER — FLUOXETINE HYDROCHLORIDE 20 MG/1
20 CAPSULE ORAL DAILY
Qty: 90 CAPSULE | Refills: 1 | Status: SHIPPED | OUTPATIENT
Start: 2022-01-31

## 2022-01-31 NOTE — FLOWSHEET NOTE
Outpatient Physical Therapy  [place]           [x] Phone: [phone]   Fax: [phone]  [place]           [] Phone: [phone]   Fax: [phone]        Physical Therapy Daily Treatment Note  Date:  2022    Patient Name:  Yolette Villa    :  1960  MRN: [id number]  Restrictions/Precautions:     Diagnosis:      Date of Injury/Surgery:   Treatment Diagnosis: Treatment Diagnosis: Rotator cuff tendonitis, left,  L shoulder pain, abnormal posture. Insurance/Certification information:   [employer] - [facility]  Referring Physician:  Referring Practitioner: Dr. Lencho Shah  Next Doctor Visit:    Plan of care signed (Y/N):  yes  Outcome Measure: QuickDASH 16% impaired  Visit# / total visits:     Pain level: 210 L shoulder  Goals:     Patient goals : Improve use of L arm  Short term goals  Time Frame for Short term goals: 4 weeks  Short term goal 1: Patient will report compliance with HEP. Short term goal 2: Patient will report a decrease in radiculopathy symptoms in the L hand. Short term goal 3: Patient will present with improved L shoulder AROM by 10 degrees in flexion and abduction. Short term goal 4: Patient will report improved ability to don clothing. Long term goals  Time Frame for Long term goals : 8 weeks  Long term goal 1: Patient will present with an improved score on the QuickDASH from 50% impaired to 40% impaired. Long term goal 2: Patient will present with improved  L shoulder strength by 1/2 grade on MMT scale. Summary of Evaluation: Assessment: Yolette Villa is a 63year old female who was referred to physical therapy for treatment of Left rotator cuff tendonitis. Objective findings include decreased L shoulder AROM, decreased L shoulder strength, sensation deficits in the L hand, and increased tenderness and pain along the L bicep. Saravanan Meredith will benefit from skilled therapeutic intervention in this setting to decrease pain and improve overall mobility.         Subjective: La Landeros states that she is now able to lift her arm above shoulder level. Her goal is to continue to move the RUE overhead without an increase in pain. La Landeros reports some mild shoulder pain (5/10) when moving the arm above shoulder height. She reports that her pain is nothing like it was when she last came into PT. La Landeros states that she was completing some of the neck exercises from the initial evaluation and reports that she is no longer having tingling in her fingers as frequently. It may still occur if she drives in the car for a few hours. She reports that everyday she tries to see how high she can move her arm. La Landeros states that she did reach out to her MD about an MRI, however has not heard back from them. Any changes in Ambulatory Summary Sheet?   None        Objective:  See eval   COVID screening questions were asked and patient attested that there had been no contact or symptoms    Strength LUE  Strength LUE: Exception  L Shoulder Flexion: 4-/5 (painful)  L Shoulder ABduction: 5/5  L Shoulder Internal Rotation: 4+/5  L Shoulder External Rotation: 4-/5      Strength Other  Additional Measures  Special Tests: Christian Perales (-)  Salvatore heller (+)  Sensation  Overall Sensation status: normal sensation in L hand today      LUE AROM : Exceptions  L Shoulder Flexion 0-180:  118  L Shoulder ABduction 0-180:  155  L Shoulder Int Rotation  0-70: Level of T10  L Shoulder Ext Rotation  0-90: Level of C5 no pain    L shoulder PROM  L shoulder flexion  165 degrees  L shoulder abduction 170 degrees  L shoulder IR  75 degrees  L shoulder ER  75 degrees    Glenohumeral joint mobility  Hypomobile A-P glide          Exercises: (No more than 4 columns)   Exercise/Equipment 12/13/21 #1 1/31/21 #2 Date           WARM UP         UBE next           TABLE      Supine chin tuck X10, 3\" hold -    Seated upper trap stretch 30\", x2 -    Rows with band x10  RTB x10  RTB    SL shoulder ER  X10, 2lb weight STANDING      AROM shoulder flexion to 90 degrees next x10  Cueing to avoid shoulder hiking    Shoulder IR, ER with RTB next next    Doorway stretch  30\", x2                                 PROPRIOCEPTION                                    MODALITIES                      Other Therapeutic Activities/Education:    Discussed findings from evaluation. Reviewed parameters for icing / heat for pain control. Review proper posture and importance of prevention of impingement syndrome and nerve related pain in LUE and shoulder. Home Exercise Program:  Created and reviewed      Manual Treatments:  STM to bicep      Modalities:        Communication with other providers:  POC sent for cosign on 12/13/21      Assessment:  (Response towards treatment session) (Pain Rating)  Progress Note:  Irwin Coello continues to work towards her goals in physical therapy. Since the initial evaluation, she does present with slightly improved L shoulder AROM. She continues to present with deficits in strength  L shoulder flexion, abduction and ER, however. Irwin Coello will continue to benefit from skilled therapeutic intervention in this setting to decrease pain and improve overall functional mobility. Assessment: Nuno Crump is a 63year old female who was referred to physical therapy for treatment of Left rotator cuff tendonitis. Objective findings include decreased L shoulder AROM, decreased L shoulder strength, sensation deficits in the L hand, and increased tenderness and pain along the L bicep. Irwin Coello will benefit from skilled therapeutic intervention in this setting to decrease pain and improve overall mobility.       Plan for Next Session:  Progress ROM and strengthening as tolerated      Time In / Time Out:    1695-1252       If BWC Please Indicate Time In/Out/Total Time  CPT Code Time in Time out Total Time                                                            Total for session             Timed Code/Total Treatment Minutes: 35'/35'   There ex 20', There act 15'      Next Progress Note due:  10th visit      Plan of Care Interventions:  [x] Therapeutic Exercise  [x] Modalities:  [x] Therapeutic Activity     [] Ultrasound  [x] Estim  [x] Gait Training      [] Cervical Traction [] Lumbar Traction  [x] Neuromuscular Re-education    [x] Cold/hotpack [] Iontophoresis   [x] Instruction in HEP      [x] Vasopneumatic   [] Dry Needling    [x] Manual Therapy               [] Aquatic Therapy              Electronically signed by:  Juan Pryor PT, 1/31/2022, 5:50 PM

## 2022-02-01 RX ORDER — MINOCYCLINE HYDROCHLORIDE 100 MG/1
100 CAPSULE ORAL DAILY
Qty: 30 CAPSULE | Refills: 0 | Status: SHIPPED | OUTPATIENT
Start: 2022-02-01

## 2022-02-01 RX ORDER — DILTIAZEM HYDROCHLORIDE 60 MG/1
2 TABLET, FILM COATED ORAL 2 TIMES DAILY
Qty: 1 EACH | Refills: 1 | Status: SHIPPED | OUTPATIENT
Start: 2022-02-01

## 2022-03-08 RX ORDER — TIZANIDINE 4 MG/1
TABLET ORAL
Qty: 30 TABLET | Refills: 0 | Status: SHIPPED | OUTPATIENT
Start: 2022-03-08

## 2022-03-11 ENCOUNTER — OFFICE VISIT (OUTPATIENT)
Dept: FAMILY MEDICINE CLINIC | Age: 62
End: 2022-03-11
Payer: COMMERCIAL

## 2022-03-11 ENCOUNTER — OFFICE VISIT (OUTPATIENT)
Dept: ORTHOPEDIC SURGERY | Age: 62
End: 2022-03-11
Payer: COMMERCIAL

## 2022-03-11 VITALS
HEIGHT: 63 IN | RESPIRATION RATE: 16 BRPM | OXYGEN SATURATION: 99 % | HEART RATE: 66 BPM | WEIGHT: 165 LBS | BODY MASS INDEX: 29.23 KG/M2

## 2022-03-11 VITALS
HEIGHT: 64 IN | BODY MASS INDEX: 29.57 KG/M2 | TEMPERATURE: 97.4 F | WEIGHT: 173.2 LBS | OXYGEN SATURATION: 97 % | HEART RATE: 64 BPM | RESPIRATION RATE: 12 BRPM

## 2022-03-11 DIAGNOSIS — M75.82 ROTATOR CUFF TENDONITIS, LEFT: ICD-10-CM

## 2022-03-11 DIAGNOSIS — H69.83 DYSFUNCTION OF BOTH EUSTACHIAN TUBES: ICD-10-CM

## 2022-03-11 DIAGNOSIS — J06.9 VIRAL URI: Primary | ICD-10-CM

## 2022-03-11 PROCEDURE — G8427 DOCREV CUR MEDS BY ELIG CLIN: HCPCS | Performed by: PHYSICIAN ASSISTANT

## 2022-03-11 PROCEDURE — G8417 CALC BMI ABV UP PARAM F/U: HCPCS | Performed by: PHYSICIAN ASSISTANT

## 2022-03-11 PROCEDURE — 99213 OFFICE O/P EST LOW 20 MIN: CPT | Performed by: PHYSICIAN ASSISTANT

## 2022-03-11 PROCEDURE — 20610 DRAIN/INJ JOINT/BURSA W/O US: CPT | Performed by: STUDENT IN AN ORGANIZED HEALTH CARE EDUCATION/TRAINING PROGRAM

## 2022-03-11 PROCEDURE — G8484 FLU IMMUNIZE NO ADMIN: HCPCS | Performed by: PHYSICIAN ASSISTANT

## 2022-03-11 PROCEDURE — 3017F COLORECTAL CA SCREEN DOC REV: CPT | Performed by: PHYSICIAN ASSISTANT

## 2022-03-11 PROCEDURE — 1036F TOBACCO NON-USER: CPT | Performed by: PHYSICIAN ASSISTANT

## 2022-03-11 ASSESSMENT — ENCOUNTER SYMPTOMS
PHOTOPHOBIA: 0
EYE PAIN: 0
FACIAL SWELLING: 0
VOMITING: 0
COLOR CHANGE: 0
EYE REDNESS: 0
COUGH: 0
NAUSEA: 0
SHORTNESS OF BREATH: 0
BACK PAIN: 0
ABDOMINAL PAIN: 0
WHEEZING: 0
STRIDOR: 0

## 2022-03-11 NOTE — PROGRESS NOTES
3/11/22  Rosa Isela dasha  1960    FLU/COVID-19 CLINIC EVALUATION    HPI SYMPTOMS:    Employer: [employer]    [] Fevers  [] Chills  [] Cough  [] Coughing up blood  [] Chest Congestion  [x] Nasal Congestion  [] Feeling short of breath  [] Sometimes  [] Frequently  [] All the time  [] Chest pain  [x] Headaches  [x]Tolerable  [] Severe  [] Sore throat  [] Muscle aches  [] Nausea  [] Vomiting  []Unable to keep fluids down  [] Diarrhea  []Severe    [x] OTHER SYMPTOMS: Bilateral ear pain and fullness, jaw pain      Symptom Duration:   [] 1  [] 2   [] 3   [] 4    [x] 5   [] 6   [] 7   [] 8   [] 9   [] 10   [] 11   [] 12   [] 13   [] 14   [] Longer than 14 days    Symptom course:   [] Worsening     [x] Stable     [] Improving    RISK FACTORS:    [] Pregnant or possibly pregnant  [] Age over 61  [] Diabetes  [] Heart disease  [x] Asthma  [] COPD/Other chronic lung diseases  [] Active Cancer  [] On Chemotherapy  [] Taking oral steroids  [] History Lymphoma/Leukemia  [] Close contact with a lab confirmed COVID-19 patient within 14 days of symptom onset  [] History of travel from affected geographical areas within 14 days of symptom onset       VITALS:  Vitals:    03/11/22 1140   Temp: 97.4 °F (36.3 °C)   TempSrc: Infrared   Weight: 173 lb 3.2 oz (78.6 kg)   Height: 5' 3.5\" (1.613 m)        TESTS:    POCT FLU:  [] Positive     []Negative    ASSESSMENT:    [] Flu  [] Possible COVID-19  [] Strep    PLAN:    [] Discharge home with written instructions for:  [] Flu management  [] Possible COVID-19 infection with self-quarantine and management of symptoms  [] Follow-up with primary care physician or emergency department if worsens  [] Evaluation per physician/NP/PA in clinic  [] Sent to ER       An  electronic signature was used to authenticate this note.      --Naty Escobar LPN on 0/90/1077 at 31:44 AM

## 2022-03-11 NOTE — PROGRESS NOTES
3/11/2022   Chief Complaint   Patient presents with    Shoulder Pain     left      Update HPI: Patient is here for reevaluation of her left shoulder pain. Patient states she had excellent relief from the previous injection given 3 months prior and is requesting additional injection today. She states that she has been doing well with physical therapy but had to complete it due to the expenses. She continued with the exercises taught in therapy as well as the home exercises provided by us. Pain is currently 0 out of 10 but continues to have some pain in the anterior shoulder when increasing her activity. No new complaints at this time. Previous HPI (11/30/2021): Patient is here for reevaluation of her left humerus. She states the pain over the biceps has significantly improved since last being seen but she states that she has increasing pain at the rotator cuff insertion of the left shoulder. She especially has pain with overhead activities. She states that she feels that she has been using the arm differently trying to avoid using the biceps over the last several weeks since being seen. She has no new injury or complaint otherwise. Previous HPI (11-3-2021  ):                    Prem Valerio is a 63 y.o. female right handed patient referred by self for evaluation and treatment left biceps muscle belly pain. This is evaluated as a personal injury. Patient states that approximately 2 weeks prior she struck a metal fence post with the midportion of her left arm. She had immediate pain and swelling over the biceps muscle belly in this area. Over the following several days she had significant ecchymosis in the area. She was able to continue using the arm although she does have some pain with supination. She was not planning on seeking treatment but due to previous injuries in the left shoulder she was encouraged by her family to be seen and evaluated. She has no other complaints at this time. She has been taking a muscle relaxer as well as Mobic for the pain states that these do provide moderate pain relief. She is here today for first visit with me. The pain occurs every day and when active. Location of pain is left biceps muscle belly at the midportion of the humerus. No history of shoulder separation, subluxation or dislocation. No known prior left biceps or humerus injury. Symptoms are aggravated by ADL's, repetitive use, work at or above shoulder height or behind body, difficulty sleeping on affected side. Symptoms are diminished by rest, avoiding the painful activities. Limited activities include: lifting, repetitive use, work at or above shoulder height or behind body, difficulty sleeping, difficulty with ADLs. No stiffness is reported. Related history: negative for prior surgery, trauma, arthritis or disorders. Is affecting ADLs. Pain is 7/10 at it's worst.    Outside reports reviewed: none. MA HPI: Patient is in the office with left mid arm pain in the humerus. Patient states that she fell into a metal fence post around 10/17/2021 and might have injured the arm. Patient states that on the 23rd the bruising was increased when she was trying to lift a 40 pound of bird seed. Patient state eliseo murrieta has been taking a muscle relaxer, Mobic, and Tylenol. Patient's medications, allergies, past medical, surgical, social and family histories were reviewed and updated as appropriate. Patient has not previously attempted CSI, PT, NSAIDs for pain relief    Medical History  Patient's medications, allergies, past medical, surgical, social and family histories were reviewed and updated as appropriate.     Past Medical History:   Diagnosis Date    Acquired hypothyroidism     Anxiety     Arthritis     Asthma     Hand dermatitis     Hypertension     Scoliosis      Past Surgical History:   Procedure Laterality Date    HYSTERECTOMY, TOTAL ABDOMINAL      Still has 1 ovary Family History   Problem Relation Age of Onset    Rheum Arthritis Mother     COPD Mother     Thyroid Cancer Mother     COPD Father      Social History     Socioeconomic History    Marital status: Single     Spouse name: Not on file    Number of children: 2    Years of education: Not on file    Highest education level: Not on file   Occupational History    Occupation: Works [employer]   Tobacco Use    Smoking status: Never Smoker    Smokeless tobacco: Never Used   Vaping Use    Vaping Use: Never used   Substance and Sexual Activity    Alcohol use: Not Currently    Drug use: Never    Sexual activity: Not on file   Other Topics Concern    Not on file   Social History Narrative    Not on file     Social Determinants of Health     Financial Resource Strain:     Difficulty of Paying Living Expenses: Not on file   Food Insecurity:     Worried About [address] in the Last Year: Not on file    [address] [place] [employer] in the Last Year: Not on file   Transportation Needs:     Lack of Transportation (Medical): Not on file    Lack of Transportation (Non-Medical):  Not on file   Physical Activity:     Days of Exercise per Week: Not on file    Minutes of Exercise per Session: Not on file   Stress:     Feeling of Stress : Not on file   Social Connections:     Frequency of Communication with Friends and Family: Not on file    Frequency of Social Gatherings with Friends and Family: Not on file    Attends [employer] Services: Not on file    Active Member of Clubs or Organizations: Not on file    Attends Club or Organization Meetings: Not on file    Marital Status: Not on file   Intimate Partner Violence:     Fear of Current or Ex-Partner: Not on file    Emotionally Abused: Not on file    Physically Abused: Not on file    Sexually Abused: Not on file   Housing Stability:     Unable to Pay for Housing in the Last Year: Not on file    Number of [place] in the Last Year: Not on file    Unstable Housing in the Last Year: Not on file     Current Outpatient Medications   Medication Sig Dispense Refill    tiZANidine (ZANAFLEX) 4 MG tablet TAKE ONE TABLET BY MOUTH EVERY 6 HOURS AS NEEDED FOR BACK PAIN 30 tablet 0    minocycline (MINOCIN;DYNACIN) 100 MG capsule Take 1 capsule by mouth daily 30 capsule 0    SYMBICORT 80-4.5 MCG/ACT AERO Inhale 2 puffs into the lungs 2 times daily 1 each 1    FLUoxetine (PROZAC) 20 MG capsule Take 1 capsule by mouth daily 90 capsule 1    levothyroxine (SYNTHROID) 50 MCG tablet Take 1.5 tablets by mouth Daily 90 tablet 1    valACYclovir (VALTREX) 500 MG tablet Take 1 tablet by mouth 2 times daily 30 tablet 0    hydroCHLOROthiazide (HYDRODIURIL) 25 MG tablet TAKE ONE TABLET BY MOUTH DAILY 90 tablet 1    predniSONE (DELTASONE) 20 MG tablet TAKE ONE TABLET BY MOUTH DAILY AS NEEDED 30 tablet 0    montelukast (SINGULAIR) 10 MG tablet TAKE ONE TABLET BY MOUTH DAILY 30 tablet 4    atenolol (TENORMIN) 25 MG tablet TAKE 1/2 TO 1 TABLET BY MOUTH EVERY NIGHT AT BEDTIME 30 tablet 4    furosemide (LASIX) 20 MG tablet TAKE ONE TABLET BY MOUTH EVERY MORNING 30 tablet 4    atorvastatin (LIPITOR) 10 MG tablet Take 1 tablet by mouth daily 30 tablet 5    meloxicam (MOBIC) 15 MG tablet TAKE ONE TABLET BY MOUTH DAILY 30 tablet 5    albuterol sulfate HFA (VENTOLIN HFA) 108 (90 Base) MCG/ACT inhaler Inhale 2 puffs into the lungs every 6 hours as needed for Wheezing 1 Inhaler 5    acyclovir (ZOVIRAX) 200 MG capsule acyclovir 200 mg capsule      Cetirizine HCl 10 MG CAPS cetirizine 10 mg capsule   Take 1 capsule every day by oral route.  triamcinolone (NASACORT ALLERGY 24HR) 55 MCG/ACT nasal inhaler Nasacort 55 mcg nasal spray aerosol   Take 2 sprays every day by nasal route.       Multiple Vitamin (MULTIVITAMIN ADULT PO) multivitamin   one daily (Patient not taking: Reported on 11/3/2021)      meloxicam (MOBIC) 7.5 MG tablet Take by mouth  (Patient not taking: Reported on 3/11/2022)      triamcinolone (ARISTOCORT) 0.5 % cream APPLY TO AFFECTED AREA(S) DAILY (Patient not taking: Reported on 11/3/2021) 15 g 2     No current facility-administered medications for this visit. Allergies   Allergen Reactions    Black Pepper-Turmeric Shortness Of Breath    Aspirin     Codeine     Fluoride Preparations     Penicillins     Piper     Sulfa Antibiotics          Review of Systems   Constitutional: Negative for activity change, appetite change, chills, diaphoresis, fatigue, fever and unexpected weight change. HENT: Negative for congestion, ear pain, facial swelling, hearing loss and sneezing. Eyes: Negative for photophobia, pain and redness. Respiratory: Negative for cough, shortness of breath, wheezing and stridor. Cardiovascular: Negative for chest pain, palpitations and leg swelling. Gastrointestinal: Negative for abdominal pain, nausea and vomiting. Endocrine: Negative for cold intolerance and heat intolerance. Musculoskeletal: Positive for myalgias. Negative for arthralgias, back pain, gait problem, joint swelling, neck pain and neck stiffness. Skin: Negative for color change, pallor, rash and wound. Neurological: Negative for dizziness, facial asymmetry, weakness and numbness. Examination:  General Exam:  Vitals: Pulse 66   Resp 16   Ht 5' 3\" (1.6 m)   Wt 165 lb (74.8 kg)   SpO2 99%   BMI 29.23 kg/m²    Physical Exam  Constitutional:       Appearance: Normal appearance. She is normal weight. HENT:      Head: Normocephalic and atraumatic. Nose: Nose normal.   Eyes:      General:         Right eye: No discharge. Left eye: No discharge. Extraocular Movements: Extraocular movements intact. Cardiovascular:      Pulses: Normal pulses. Pulmonary:      Effort: Pulmonary effort is normal.      Breath sounds: Normal breath sounds. Musculoskeletal:         General: Tenderness present.  No swelling, deformity or signs of injury. Right shoulder: Normal.      Left shoulder: Normal. No swelling, deformity, effusion, laceration, tenderness, bony tenderness or crepitus. Normal range of motion. Normal strength. Normal pulse. Left upper arm: Tenderness and bony tenderness present. No swelling, edema, deformity or lacerations. Right elbow: Normal.      Left elbow: Normal.      Right forearm: Normal.      Left forearm: Normal.      Right wrist: Normal.      Left wrist: Normal.      Cervical back: Normal and normal range of motion. No rigidity or tenderness. Skin:     General: Skin is warm and dry. Capillary Refill: Capillary refill takes less than 2 seconds. Findings: No bruising. Neurological:      General: No focal deficit present. Mental Status: She is alert and oriented to person, place, and time. LEFT SHOULDER / UPPER EXTREMITY EXAM      OBSERVATION:      Swelling: None  deformity: none    Discoloration: none   Scapular winging: none    Scars: none    Atrophy: none      TENDERNESS / CREPITUS (T/C):      Clavicle -/-    SUPRAspinatus  -/-     AC Jt. -/-    INFRAspinatus -/-     SC Jt. -/-    Deltoid -/-     G.  Tuberosity +/-   LH BICEP groove +/-    Acromion: -/-    Midline Neck -/-     Scapular Spine -/-   Trapezium -/-    SMA Scapula -/-   GH jt. line - post -/-     Scapulothoracic -/-   GH jt. line - ant +/-         ROM: (* = with pain)  Left shoulder   Right shoulder     AROM (PROM)  AROM (PROM)    FE   170° (175°)    170° (175°)     ER 0°   60° (65°)   60° (65°)     ER 90° ABD  90° (90°)   90°  (90°)    IR 90° ABD  40(40°)   40  (40°)     IR (spine) T12   T10      STRENGTH: (* = with pain) Left shoulder   Right shoulder    SCAPTION   5-/5    5/5     IR    5/5    5/5     ER    5/5    5/5     BICEPS   5-/5 *   5/5     Deltoid   5/5    5/5       SIGNS:  LUE     NEER: Positive but improved      OPRASADS: Positive but improved     SAHU: Positive  SPEEDS: Positive    DROP ARM: neg  BELLY PRESS: neg    LIFT-OFF: neg  Superior escape: none     X-Body ADD: Positive  MOVING VALGUS: neg     CRANK: neg   Bear Hug: neg    Biceps stretch test: Positive but improved      EXTREMITY NEURO-VASCULAR EXAM:     Sensation grossly intact to light touch all dermatomal regions. DTR 2+ Biceps, Triceps, BR and Negative Cindis sign    Grossly intact motor function at Elbow, Wrist and Hand    Distal pulses radial and ulnar 2+, brisk cap refill, symmetric. NECK:  Painless FROM and spinous processes non-tender. Negative Spurlings sign. Diagnostic testing:  No new orthopedic imaging    Previous imaging:  X-ray images were reviewed by myself and discussed with the patient:  2 view of the left humerus in a skeletally mature patient shows no acute osseous abnormalities. Alignment is appropriate. No issues related to the glenohumeral or elbow joint. No sign of any soft tissue avulsion type injury. No osseous lesions. Soft tissue swelling seen at midshaft of humerus. Office Procedures:  No orders of the defined types were placed in this encounter. Right subacromial Shoulder Injection Procedure Note   Pre-operative Diagnosis: Right rotator cuff tendinitis   Post-operative Diagnosis: same   Indications: Symptomatic relief of tendinitis   Anesthesia: Lidocaine 1% and marcaine 0.5% without epinephrine without added sodium bicarbonate   Procedure Details   Verbal consent was obtained for the procedure. The shoulder was prepped with alcohol. A 22 gauge needle was advanced into the subacromial space through posterior approach without difficulty The space was then injected with 1 ml 1% lidocaine and 1 ml 0.5% marcaine and 1 ml of triamcinolone (KENALOG) 40mg/ml. The injection site was cleansed with isopropyl alcohol and a dressing was applied. Complications: None; patient tolerated the procedure well. Symptoms were improved immediately after the injection.     Assessment and Plan    A: Left shoulder rotator cuff tendinitis, biceps tendinitis    P:     I had a thorough conversation with the patient regarding her left shoulder symptoms and treatment course. I explained that she has much improved rotator cuff tendinitis with some continued biceps tendinitis. I discussed both a subacromial injection as well as a biceps tendon sheath injection but she would like to continue with the subacromial injection as she did so well previously. This was done without complication and patient had immediate pain relief. Patient will be seen as needed but I explained that she can return within 3 months for an additional injection of the biceps tendon if this becomes a worsening issue. Patient also requested a home exercise program for core strengthening due to underlying scoliosis and this was provided. She also requested a prescription for stand-up desk due to issues related to her shoulder as well as her back and this was also provided. Patient should continue using ice and over-the-counter anti-inflammatories for pain control. I encouraged her to continue her exercises. All questions were answered and patient voiced understanding.  Follow-up prn    Electronically signed by Michelle Currie DO on 3/11/2022 at 10:27 AM

## 2022-03-11 NOTE — PATIENT INSTRUCTIONS
Continue to weight bear as tolerated  Continue range of motion  Ice and elevate as needed  Tylenol or Motrin for pain  Injection given today in the office   Rest for 24-48 hours  Follow up as needed  Continue at home exercises. Patient Education        Healthy Upper Back: Exercises  Introduction  Here are some examples of exercises for your upper back. Start each exercise slowly. Ease off the exercise if you start to have pain. Your doctor or physical therapist will tell you when you can start these exercises and which ones will work best for you. How to do the exercises  Lower neck and upper back stretch    1. Stretch your arms out in front of your body. Clasp one hand on top of your other hand. 2. Gently reach out so that you feel your shoulder blades stretching away from each other. 3. Gently bend your head forward. 4. Hold for 15 to 30 seconds. 5. Repeat 2 to 4 times. Midback stretch    If you have knee pain, do not do this exercise. 1. Kneel on the floor, and sit back on your ankles. 2. Lean forward, place your hands on the floor, and stretch your arms out in front of you. Rest your head between your arms. 3. Gently push your chest toward the floor, reaching as far in front of you as possible. 4. Hold for 15 to 30 seconds. 5. Repeat 2 to 4 times. Shoulder rolls    1. Sit comfortably with your feet shoulder-width apart. You can also do this exercise while standing. 2. Roll your shoulders up, then back, and then down in a smooth, circular motion. 3. Repeat 2 to 4 times. Wall push-up    1. Stand against a wall with your feet about 12 to 24 inches back from the wall. If you feel any pain when you do this exercise, stand closer to the wall. 2. Place your hands on the wall slightly wider apart than your shoulders, and lean forward. 3. Gently lean your body toward the wall. Then push back to your starting position. Keep the motion smooth and controlled. 4. Repeat 8 to 12 times.   Resisted shoulder blade squeeze    For this exercise, you will need elastic exercise material, such as surgical tubing or Thera-Band. 1. Sit or stand, holding the band in both hands in front of you. Keep your elbows close to your sides, bent at a 90-degree angle. Your palms should face up. 2. Squeeze your shoulder blades together, and move your arms to the outside, stretching the band. Be sure to keep your elbows at your sides while you do this. 3. Relax. 4. Repeat 8 to 12 times. Resisted rows    For this exercise, you will need elastic exercise material, such as surgical tubing or Thera-Band. 1. Put the band around a solid object, such as a bedpost, at about waist level. Hold one end of the band in each hand. 2. With your elbows at your sides and bent to 90 degrees, pull the band back to move your shoulder blades toward each other. Return to the starting position. 3. Repeat 8 to 12 times. Follow-up care is a key part of your treatment and safety. Be sure to make and go to all appointments, and call your doctor if you are having problems. It's also a good idea to know your test results and keep a list of the medicines you take. Where can you learn more? Go to https://LivelyFeed.[employer]. org and sign in to your BuildDirect account. Enter Y830 in the [employer] box to learn more about \"Healthy Upper Back: Exercises. \"     If you do not have an account, please click on the \"Sign Up Now\" link. Current as of: July 1, 2021               Content Version: 13.1  © 4792-8954 Healthwise, Incorporated. Care instructions adapted under license by [facility] ([facility]). If you have questions about a medical condition or this instruction, always ask your healthcare professional. [place] [address] any warranty or liability for your use of this information. Patient Education        Low Back Pain: Exercises  Introduction  Here are some examples of exercises for you to try.  The exercises may be suggested for a condition or for rehabilitation. Start each exercise slowly. Ease off the exercises if you start to have pain. You will be told when to start these exercises and which ones will work best for you. How to do the exercises  Press-up    1. Lie on your stomach, supporting your body with your forearms. 2. Press your elbows down into the floor to raise your upper back. As you do this, relax your stomach muscles and allow your back to arch without using your back muscles. As your press up, do not let your hips or pelvis come off the floor. 3. Hold for 15 to 30 seconds, then relax. 4. Repeat 2 to 4 times. Alternate arm and leg (bird dog) exercise    Do this exercise slowly. Try to keep your body straight at all times, and do not let one hip drop lower than the other. 1. Start on the floor, on your hands and knees. 2. Tighten your belly muscles. 3. Raise one leg off the floor, and hold it straight out behind you. Be careful not to let your hip drop down, because that will twist your trunk. 4. Hold for about 6 seconds, then lower your leg and switch to the other leg. 5. Repeat 8 to 12 times on each leg. 6. Over time, work up to holding for 10 to 30 seconds each time. 7. If you feel stable and secure with your leg raised, try raising the opposite arm straight out in front of you at the same time. Knee-to-chest exercise    1. Lie on your back with your knees bent and your feet flat on the floor. 2. Bring one knee to your chest, keeping the other foot flat on the floor (or keeping the other leg straight, whichever feels better on your lower back). 3. Keep your lower back pressed to the floor. Hold for at least 15 to 30 seconds. 4. Relax, and lower the knee to the starting position. 5. Repeat with the other leg. Repeat 2 to 4 times with each leg. 6. To get more stretch, put your other leg flat on the floor while pulling your knee to your chest.  Curl-ups    1.  Lie on the floor on your back with your knees bent at a 90-degree angle. Your feet should be flat on the floor, about 12 inches from your buttocks. 2. Cross your arms over your chest. If this bothers your neck, try putting your hands behind your neck (not your head), with your elbows spread apart. 3. Slowly tighten your belly muscles and raise your shoulder blades off the floor. 4. Keep your head in line with your body, and do not press your chin to your chest.  5. Hold this position for 1 or 2 seconds, then slowly lower yourself back down to the floor. 6. Repeat 8 to 12 times. Pelvic tilt exercise    1. Lie on your back with your knees bent. 2. \"Brace\" your stomach. This means to tighten your muscles by pulling in and imagining your belly button moving toward your spine. You should feel like your back is pressing to the floor and your hips and pelvis are rocking back. 3. Hold for about 6 seconds while you breathe smoothly. 4. Repeat 8 to 12 times. Heel dig bridging    1. Lie on your back with both knees bent and your ankles bent so that only your heels are digging into the floor. Your knees should be bent about 90 degrees. 2. Then push your heels into the floor, squeeze your buttocks, and lift your hips off the floor until your shoulders, hips, and knees are all in a straight line. 3. Hold for about 6 seconds as you continue to breathe normally, and then slowly lower your hips back down to the floor and rest for up to 10 seconds. 4. Do 8 to 12 repetitions. Hamstring stretch in doorway    1. Lie on your back in a doorway, with one leg through the open door. 2. Slide your leg up the wall to straighten your knee. You should feel a gentle stretch down the back of your leg. 3. Hold the stretch for at least 15 to 30 seconds. Do not arch your back, point your toes, or bend either knee. Keep one heel touching the floor and the other heel touching the wall. 4. Repeat with your other leg. 5. Do 2 to 4 times for each leg. Hip flexor stretch    1.  Kneel on the floor with one knee bent and one leg behind you. Place your forward knee over your foot. Keep your other knee touching the floor. 2. Slowly push your hips forward until you feel a stretch in the upper thigh of your rear leg. 3. Hold the stretch for at least 15 to 30 seconds. Repeat with your other leg. 4. Do 2 to 4 times on each side. Wall sit    1. Stand with your back 10 to 12 inches away from a wall. 2. Lean into the wall until your back is flat against it. 3. Slowly slide down until your knees are slightly bent, pressing your lower back into the wall. 4. Hold for about 6 seconds, then slide back up the wall. 5. Repeat 8 to 12 times. Follow-up care is a key part of your treatment and safety. Be sure to make and go to all appointments, and call your doctor if you are having problems. It's also a good idea to know your test results and keep a list of the medicines you take. Where can you learn more? Go to https://IQumulus.[employer]. org and sign in to your zintin account. Enter N094 in the [employer] box to learn more about \"Low Back Pain: Exercises. \"     If you do not have an account, please click on the \"Sign Up Now\" link. Current as of: July 1, 2021               Content Version: 13.1  © 2006-2021 Healthwise, Incorporated. Care instructions adapted under license by [facility] ([facility]). If you have questions about a medical condition or this instruction, always ask your healthcare professional. [place] [address] any warranty or liability for your use of this information.

## 2022-03-11 NOTE — PROGRESS NOTES
3/11/2022    HPI:  Chief complaint and history of present illness as per medical assistant/nurse documented today in the Flu/COVID-19 clinic. Patient presents to the clinic today with complaints of 5-day history of nasal congestion, intermittent headaches, bilateral ear fullness. She states every once in a while, she will have mild discomfort in the left ear that seems to travel down the jaw. She does not report any teeth grinding or clenching. She denies chest pain, shortness of breath, cough, fever or chills. She denies nausea, vomiting, diarrhea, loss of taste or smell. She states that she has a well-established history of allergies and uses daily Zyrtec and Singulair. She has tried an over-the-counter decongestant since this ear issue developed and it is temporarily helpful. No known Covid exposure. She states that she works from home for [email or web address]. She is vaccinated against the virus x3. MEDICATIONS:  Prior to Visit Medications    Medication Sig Taking?  Authorizing Provider   tiZANidine (ZANAFLEX) 4 MG tablet TAKE ONE TABLET BY MOUTH EVERY 6 HOURS AS NEEDED FOR BACK PAIN  Ruslan Dave MD   minocycline (MINOCIN;DYNACIN) 100 MG capsule Take 1 capsule by mouth daily  Ruslan Dave MD   SYMBICORT 80-4.5 MCG/ACT AERO Inhale 2 puffs into the lungs 2 times daily  Ruslan Dave MD   FLUoxetine (PROZAC) 20 MG capsule Take 1 capsule by mouth daily  Ruslan Dave MD   levothyroxine (SYNTHROID) 50 MCG tablet Take 1.5 tablets by mouth Daily  Ruslan Dave MD   valACYclovir (VALTREX) 500 MG tablet Take 1 tablet by mouth 2 times daily  Rusaln Dave MD   hydroCHLOROthiazide (HYDRODIURIL) 25 MG tablet TAKE ONE TABLET BY MOUTH DAILY  Ruslan Dave MD   predniSONE (DELTASONE) 20 MG tablet TAKE ONE TABLET BY MOUTH DAILY AS NEEDED  Ruslan Dave MD   montelukast (SINGULAIR) 10 MG tablet TAKE ONE TABLET BY MOUTH Bobbi Stratton MD   atenolol (TENORMIN) 25 MG tablet TAKE 1/2 TO 1 TABLET BY MOUTH EVERY NIGHT AT BEDTIME  Ailin Bridges MD   furosemide (LASIX) 20 MG tablet TAKE ONE TABLET BY MOUTH EVERY MORNING  Ailin Bridges MD   atorvastatin (LIPITOR) 10 MG tablet Take 1 tablet by mouth daily  Ailin Bridges MD   meloxicam (MOBIC) 15 MG tablet TAKE ONE TABLET BY MOUTH DAILY  Ailin Bridges MD   albuterol sulfate HFA (VENTOLIN HFA) 108 (90 Base) MCG/ACT inhaler Inhale 2 puffs into the lungs every 6 hours as needed for Wheezing  Ailin Bridges MD   Multiple Vitamin (MULTIVITAMIN ADULT PO) multivitamin   one daily  Patient not taking: Reported on 11/3/2021  Historical Provider, MD   acyclovir (ZOVIRAX) 200 MG capsule acyclovir 200 mg capsule  Historical Provider, MD   Cetirizine HCl 10 MG CAPS cetirizine 10 mg capsule   Take 1 capsule every day by oral route. Historical Provider, MD   meloxicam (MOBIC) 7.5 MG tablet Take by mouth   Patient not taking: Reported on 3/11/2022  Historical Provider, MD   triamcinolone (ARISTOCORT) 0.5 % cream APPLY TO AFFECTED AREA(S) DAILY  Patient not taking: Reported on 11/3/2021  Ailin Bridges MD   triamcinolone (NASACORT ALLERGY 24HR) 55 MCG/ACT nasal inhaler Nasacort 55 mcg nasal spray aerosol   Take 2 sprays every day by nasal route.   Historical Provider, MD       Allergies   Allergen Reactions    Black Pepper-Turmeric Shortness Of Breath    Aspirin     Codeine     Fluoride Preparations     Penicillins     Piper     Sulfa Antibiotics    ,   Past Medical History:   Diagnosis Date    Acquired hypothyroidism     Anxiety     Arthritis     Asthma     Hand dermatitis     Hypertension     Scoliosis        PHYSICAL EXAM:  Physical Exam    Vitals:    03/11/22 1140   Pulse: 64   Resp: 12   Temp: 97.4 °F (36.3 °C)   SpO2: 97%         Constitutional:  Well developed, well nourished  HENT:  Normocephalic, atraumatic, bilateral external ears normal, bilateral ear canals normal, bilateral TMs normal, no pain with tragal manipulation, no mastoid pain, no TMJ tenderness. Oropharynx moist, postnasal drip noted. No petechiae or exudate. Uvula midline and benign and airway patent. Mild nasal congestion bilaterally. Eyes:  conjunctiva normal, no discharge, no scleral icterus  Cardiovascular:  Normal heart rate, normal rhythm, no murmurs, gallops or rubs  Thorax & Lungs:  Normal breath sounds, no respiratory distress, no wheezing, no rales, no rhonchi  Skin:  Warm, dry, no erythema, no rash  Neurologic:  Alert & oriented   Psychiatric:  Affect normal, mood normal    ASSESSMENT/PLAN:  1. Viral URI    2. Dysfunction of both eustachian tubes    Patient declined COVID-19 testing  Discussed likely viral etiology  Discussed eustachian tube dysfunction  Supportive measures encouraged  Rest, fluids, continue daily allergy medication. Add Flonase nasal spray. Drink through straws, chew gum, exercise jaw. Follow-up with PCP or return to clinic if symptoms persist.    Patient declined after visit summary. I did don appropriate PPE (including N95 face mask, protective safety glasses, face shield, gloves, and gown) as recommended by the health facility/national standard best practice, during my interaction with the patient. FOLLOW-UP:  No follow-ups on file.       Savannah Carbajal PA-C

## 2022-03-11 NOTE — PROGRESS NOTES
Pt comes in today for a follow up of left shoulder injection on 11/30/21. Patient rates her pain at 0/10 currently. She states that if she uses her arm a lot then her pain will increase to 4-5/10. She tried PT but states that it was too expensive. She has been doing exercises at home and states that the exercises help. She is requesting another injection.    Dominant hand: right  Occupation:

## 2022-03-16 RX ORDER — ATENOLOL 25 MG/1
25 TABLET ORAL DAILY
Qty: 30 TABLET | Refills: 1 | Status: SHIPPED | OUTPATIENT
Start: 2022-03-16

## 2022-03-16 RX ORDER — LEVOTHYROXINE SODIUM 0.07 MG/1
75 TABLET ORAL DAILY
Qty: 30 TABLET | Refills: 1 | Status: SHIPPED | OUTPATIENT
Start: 2022-03-16

## 2022-03-16 RX ORDER — HYDROCHLOROTHIAZIDE 25 MG/1
25 TABLET ORAL DAILY
Qty: 30 TABLET | Refills: 1 | Status: SHIPPED | OUTPATIENT
Start: 2022-03-16

## 2022-03-16 RX ORDER — FUROSEMIDE 20 MG/1
20 TABLET ORAL DAILY
Qty: 30 TABLET | Refills: 1 | Status: SHIPPED | OUTPATIENT
Start: 2022-03-16

## 2022-03-16 RX ORDER — ATORVASTATIN CALCIUM 10 MG/1
10 TABLET, FILM COATED ORAL DAILY
Qty: 30 TABLET | Refills: 1 | Status: SHIPPED | OUTPATIENT
Start: 2022-03-16

## 2022-03-16 RX ORDER — MONTELUKAST SODIUM 10 MG/1
10 TABLET ORAL NIGHTLY
Qty: 30 TABLET | Refills: 1 | Status: SHIPPED | OUTPATIENT
Start: 2022-03-16

## 2022-03-16 NOTE — TELEPHONE ENCOUNTER
Symbicort is now too expensive; requesting a written script so she can shop around with good rx. Will  when ready. Furosemide makes her ears ring, anything similar to that that wouldn't have that side effect? Patient aware that Dr. Balbir Dietz is out of the office until next Thursday, please advise of unable to do refills. Patient last seen 9/7/21.  Is out of atenolol, furosemide, and montelukast.

## 2022-04-12 RX ORDER — TIZANIDINE 4 MG/1
TABLET ORAL
Qty: 30 TABLET | Refills: 0 | OUTPATIENT
Start: 2022-04-12

## 2022-04-12 RX ORDER — DILTIAZEM HYDROCHLORIDE 60 MG/1
TABLET, FILM COATED ORAL
Qty: 10.2 G | OUTPATIENT
Start: 2022-04-12

## 2022-04-12 RX ORDER — MINOCYCLINE HYDROCHLORIDE 100 MG/1
CAPSULE ORAL
Qty: 30 CAPSULE | Refills: 0 | OUTPATIENT
Start: 2022-04-12

## 2022-05-16 RX ORDER — ATENOLOL 25 MG/1
TABLET ORAL
Qty: 30 TABLET | Refills: 1 | OUTPATIENT
Start: 2022-05-16

## 2022-05-16 RX ORDER — LEVOTHYROXINE SODIUM 0.07 MG/1
TABLET ORAL
Qty: 30 TABLET | Refills: 1 | OUTPATIENT
Start: 2022-05-16

## 2022-05-16 RX ORDER — FUROSEMIDE 20 MG/1
TABLET ORAL
Qty: 30 TABLET | Refills: 1 | OUTPATIENT
Start: 2022-05-16

## 2022-05-16 RX ORDER — ATORVASTATIN CALCIUM 10 MG/1
TABLET, FILM COATED ORAL
Qty: 30 TABLET | Refills: 1 | OUTPATIENT
Start: 2022-05-16

## 2022-05-16 RX ORDER — MONTELUKAST SODIUM 10 MG/1
TABLET ORAL
Qty: 30 TABLET | Refills: 1 | OUTPATIENT
Start: 2022-05-16

## 2022-05-23 RX ORDER — ATENOLOL 25 MG/1
TABLET ORAL
Qty: 30 TABLET | Refills: 1 | OUTPATIENT
Start: 2022-05-23

## 2022-05-23 RX ORDER — LEVOTHYROXINE SODIUM 0.07 MG/1
TABLET ORAL
Qty: 30 TABLET | Refills: 1 | OUTPATIENT
Start: 2022-05-23

## 2022-05-23 RX ORDER — ATORVASTATIN CALCIUM 10 MG/1
TABLET, FILM COATED ORAL
Qty: 30 TABLET | Refills: 1 | OUTPATIENT
Start: 2022-05-23

## 2022-05-23 RX ORDER — FUROSEMIDE 20 MG/1
TABLET ORAL
Qty: 30 TABLET | Refills: 1 | OUTPATIENT
Start: 2022-05-23

## 2022-05-23 RX ORDER — MONTELUKAST SODIUM 10 MG/1
TABLET ORAL
Qty: 30 TABLET | Refills: 1 | OUTPATIENT
Start: 2022-05-23

## 2022-07-12 ENCOUNTER — OFFICE VISIT (OUTPATIENT)
Dept: ORTHOPEDIC SURGERY | Age: 62
End: 2022-07-12
Payer: COMMERCIAL

## 2022-07-12 VITALS
DIASTOLIC BLOOD PRESSURE: 81 MMHG | HEIGHT: 64 IN | HEART RATE: 66 BPM | OXYGEN SATURATION: 99 % | RESPIRATION RATE: 17 BRPM | SYSTOLIC BLOOD PRESSURE: 124 MMHG | BODY MASS INDEX: 29.02 KG/M2 | WEIGHT: 170 LBS

## 2022-07-12 DIAGNOSIS — M75.82 ROTATOR CUFF TENDINITIS, LEFT: ICD-10-CM

## 2022-07-12 PROCEDURE — 20610 DRAIN/INJ JOINT/BURSA W/O US: CPT | Performed by: STUDENT IN AN ORGANIZED HEALTH CARE EDUCATION/TRAINING PROGRAM

## 2022-07-12 ASSESSMENT — ENCOUNTER SYMPTOMS
VOMITING: 0
EYE REDNESS: 0
WHEEZING: 0
COUGH: 0
COLOR CHANGE: 0
NAUSEA: 0
FACIAL SWELLING: 0
SHORTNESS OF BREATH: 0
PHOTOPHOBIA: 0
STRIDOR: 0
ABDOMINAL PAIN: 0
BACK PAIN: 0
EYE PAIN: 0

## 2022-07-12 NOTE — PATIENT INSTRUCTIONS
Continue to weight bear as tolerated  Continue range of motion  Ice and elevate as needed  Tylenol or Motrin for pain  Injection given today in the office   Rest for 24-48 hours  Follow up in 3 months  Continue home exercises

## 2022-07-12 NOTE — PROGRESS NOTES
Patient comes in today for a follow up post shoulder injection on 3/11/22. She rates her pain at 2/10 currently. Her pain was better for about 1 month after the injection but has started coming back. She has been doing stretches to keep it loose. She wants another injection today. She denies any new falls or injuries to her left shoulder.

## 2022-07-12 NOTE — PROGRESS NOTES
Subjective: Had some diarrhea last night. Improved. C/O of dry mouth, hence poor apetite as food doesnt taste good any more, UOP better. No SOB, On IVF. Overall feeling better_      Objective: _    Vitals:   Vital Signs (last 24 hrs)_____ Last Charted___________Minimum____________ Maximum____________  Temp    97.8  (JAN 11 07:29) 97.8  (JAN 11 07:29) 98.3  (LADAN 10 15:52)  Heart Rate   H 124 (JAN 11 07:29) 86  (LADAN 10 15:52) H 125 (LADAN 10 19:11)  Resp Rate       18  (JAN 11 07:29) 18  (JAN 11 07:29) 20  (LADAN 10 15:52)  SBP    118  (JAN 11 07:29) 103  (LADAN 10 21:10) 118  (JAN 11 07:29)  DBP    69  (JAN 11 07:29) 60  (LADAN 10 21:10) 69  (JAN 11 07:29)      Intake Output Balance    01/11/2019 7a-3p   310     0   310 As of 13:22   3p-11p     0     0     0   11p-7a     0     0     0   Totals   310     0   310    01/10/2019 7a-3p     0     0     0   3p-11p   400     0   400   11p-7a  1490   700   790   Totals  1890   700  1190    01/09/2019 7a-3p     0     0     0   3p-11p     0     0     0   11p-7a     0     0     0   Totals     0     0     0    Constitutional : axox3 in NAD. Om dry  HEENT: _AT, NC, PERRLA, EOMI  Neck: _Supple  Respiratory: _CTA B  CV: _S1 and S2 audible  GI: bening_  Musculoskeletal: _  Mental Status:_Grossly intact. No deficits      Labs: _  Labs (Last four charted values)  WBC                  4.1 (JAN 11) 4.9 (LADAN 10)   Hgb                  L 7.4 (JAN 11) L 9.4 (LADAN 10)   Hct                  L 24 (JAN 11) L 30 (LADAN 10)   Plt                  L 129 (JAN 11) L 137 (LADAN 10)   Na                   140 (JAN 11) L 134 (LADAN 10) 137 (LADAN 10)   K                    3.7 (JAN 11) L 3.1 (LADAN 10) L 2.9 (LADAN 10)   CO2                  L 18 (JAN 11) L 18 (LADAN 10) 22 (LADAN 10)   Cl                   H 110 (JAN 11) 102 (LADAN 10) 98 (LADAN 10)   Cr                   H 2.33 (JAN 11) H 2.50 (LADAN 10) H 2.98 (LADAN 10)   BUN                  H 36 (JAN 11) H 35 (LADAN 10) H 39 (LADAN 10)   Glucose              7/12/2022   Chief Complaint   Patient presents with    Shoulder Pain     left      Updated HPI: Patient is here for reevaluation of her left shoulder pain. Patient states she continues with excellent relief from previous injections and she would like another one today if possible. She states that she had a essentially full relief of her symptoms for over a month with the last 1 and significantly improved pain since then. She would like a new injection today. No new injuries or complaint since last being seen. She continues with home exercise program intermittently. Previous HPI (3/11/2022): Patient is here for reevaluation of her left shoulder pain. Patient states she had excellent relief from the previous injection given 3 months prior and is requesting additional injection today. She states that she has been doing well with physical therapy but had to complete it due to the expenses. She continued with the exercises taught in therapy as well as the home exercises provided by us. Pain is currently 0 out of 10 but continues to have some pain in the anterior shoulder when increasing her activity. No new complaints at this time. Previous HPI (11/30/2021): Patient is here for reevaluation of her left humerus. She states the pain over the biceps has significantly improved since last being seen but she states that she has increasing pain at the rotator cuff insertion of the left shoulder. She especially has pain with overhead activities. She states that she feels that she has been using the arm differently trying to avoid using the biceps over the last several weeks since being seen. She has no new injury or complaint otherwise. Previous HPI (11-3-2021  ):                    Balaji Vital is a 63 y.o. female right handed patient referred by self for evaluation and treatment left biceps muscle belly pain. This is evaluated as a personal injury.   Patient states that approximately 2 weeks  83 (JAN 11) 86 (LAADN 10) H 106 (LADAN 10)   Mg                   L 1.0 (JAN 11)   Phos                 2.9 (JAN 11)   Ca                   C 6.2 (JAN 11) L 6.5 (LADAN 10) L 7.3 (LADAN 10)   PT                   H 12.7 (LADAN 10)   INR                  H 1.3 (LADAN 10)   PTT                  H 31 (LADAN 10)   Troponin             0.02 (LADAN 10)   Total CK             45 (LADAN 10)         IMPRESSION:    1. Acute kidney injury.  This more than likely represents prerenal state from volume contraction.  Myeloma kidney is less likely here.  Ischemic ATN from infectious processes possible. Renal function is improving  2. Hypokalemia.  Likely secondary to poor oral intake. Resolved  3. Anemia, likely related to myeloma . Hg is 7.4 today  4. Hypertension under good control.  With volume contraction  5. Hyponatremia, mild.  Likely from volume contraction. Resolved    RECOMMENDATIONS:    1. We will continue with IV fluids for now.Renal function improving    2. Ultrasound kidneys  3. Oral intake as tolerated.  4. Strict I's and O's monitoring.  5. Avoid potential nephrotoxic agents including NSAIDs, ACE inhibitors, and ARBs.  6. Patient states he has been off of the nonsteroidal since last admission.  7. Serial chemistries  8. Labs in am                Electronically Signed On 01.11.2019 13:27  ___________________________________________________   Olga Hinojosa MD               FeNa 1.3% , UA with granular cast . Likely ATN   Renal U/S without any hydro.  Change IVF to 0.5 % NaCL with 75 mEQ of NaHCO3 2 83 cc/h r. ( pt with poor po intake)\  Replace mag            Electronically Signed On 01.11.2019 13:30  ___________________________________________________   Olga Hinojosa MD     prior she struck a metal fence post with the midportion of her left arm. She had immediate pain and swelling over the biceps muscle belly in this area. Over the following several days she had significant ecchymosis in the area. She was able to continue using the arm although she does have some pain with supination. She was not planning on seeking treatment but due to previous injuries in the left shoulder she was encouraged by her family to be seen and evaluated. She has no other complaints at this time. She has been taking a muscle relaxer as well as Mobic for the pain states that these do provide moderate pain relief. She is here today for first visit with me. The pain occurs every day and when active. Location of pain is left biceps muscle belly at the midportion of the humerus. No history of shoulder separation, subluxation or dislocation. No known prior left biceps or humerus injury. Symptoms are aggravated by ADL's, repetitive use, work at or above shoulder height or behind body, difficulty sleeping on affected side. Symptoms are diminished by rest, avoiding the painful activities. Limited activities include: lifting, repetitive use, work at or above shoulder height or behind body, difficulty sleeping, difficulty with ADLs. No stiffness is reported. Related history: negative for prior surgery, trauma, arthritis or disorders. Is affecting ADLs. Pain is 7/10 at it's worst.    Outside reports reviewed: none. MA HPI: Patient is in the office with left mid arm pain in the humerus. Patient states that she fell into a metal fence post around 10/17/2021 and might have injured the arm. Patient states that on the 23rd the bruising was increased when she was trying to lift a 40 pound of bird seed. Patient state eliseo murrieta has been taking a muscle relaxer, Mobic, and Tylenol.      Patient's medications, allergies, past medical, surgical, social and family histories were reviewed and updated as appropriate. Patient has not previously attempted CSI, PT, NSAIDs for pain relief    Medical History  Patient's medications, allergies, past medical, surgical, social and family histories were reviewed and updated as appropriate. Past Medical History:   Diagnosis Date    Acquired hypothyroidism     Anxiety     Arthritis     Asthma     Hand dermatitis     Hypertension     Scoliosis      Past Surgical History:   Procedure Laterality Date    TOTAL ABDOMINAL HYSTERECTOMY      Still has 1 ovary     Family History   Problem Relation Age of Onset    Rheum Arthritis Mother     COPD Mother     Thyroid Cancer Mother     COPD Father      Social History     Socioeconomic History    Marital status: Single     Spouse name: Not on file    Number of children: 2    Years of education: Not on file    Highest education level: Not on file   Occupational History    Occupation: Works [employer]   Tobacco Use    Smoking status: Never Smoker    Smokeless tobacco: Never Used   Vaping Use    Vaping Use: Never used   Substance and Sexual Activity    Alcohol use: Not Currently    Drug use: Never    Sexual activity: Not on file   Other Topics Concern    Not on file   Social History Narrative    Not on file     Social Determinants of Health     Financial Resource Strain:     Difficulty of Paying Living Expenses: Not on file   Food Insecurity:     Worried About [address] in the Last Year: Not on file    [address] in the Last Year: Not on file   Transportation Needs:     Lack of Transportation (Medical): Not on file    Lack of Transportation (Non-Medical):  Not on file   Physical Activity:     Days of Exercise per Week: Not on file    Minutes of Exercise per Session: Not on file   Stress:     Feeling of Stress : Not on file   Social Connections:     Frequency of Communication with Friends and Family: Not on file    Frequency of Social Gatherings with Friends and Family: Not on file   Antoine Molina Attends Gnosticism Services: Not on file    Active Member of Clubs or Organizations: Not on file    Attends Club or Organization Meetings: Not on file    Marital Status: Not on file   Intimate Partner Violence:     Fear of Current or Ex-Partner: Not on file    Emotionally Abused: Not on file    Physically Abused: Not on file    Sexually Abused: Not on file   Housing Stability:     Unable to Pay for Housing in the Last Year: Not on file    Number of [place] in the Last Year: Not on file    Unstable Housing in the Last Year: Not on file     Current Outpatient Medications   Medication Sig Dispense Refill    furosemide (LASIX) 20 MG tablet Take 1 tablet by mouth daily 30 tablet 1    hydroCHLOROthiazide (HYDRODIURIL) 25 MG tablet Take 1 tablet by mouth daily 30 tablet 1    atenolol (TENORMIN) 25 MG tablet Take 1 tablet by mouth daily 30 tablet 1    montelukast (SINGULAIR) 10 MG tablet Take 1 tablet by mouth nightly 30 tablet 1    atorvastatin (LIPITOR) 10 MG tablet Take 1 tablet by mouth daily 30 tablet 1    levothyroxine (SYNTHROID) 75 MCG tablet Take 1 tablet by mouth Daily 30 tablet 1    tiZANidine (ZANAFLEX) 4 MG tablet TAKE ONE TABLET BY MOUTH EVERY 6 HOURS AS NEEDED FOR BACK PAIN 30 tablet 0    minocycline (MINOCIN;DYNACIN) 100 MG capsule Take 1 capsule by mouth daily 30 capsule 0    SYMBICORT 80-4.5 MCG/ACT AERO Inhale 2 puffs into the lungs 2 times daily 1 each 1    FLUoxetine (PROZAC) 20 MG capsule Take 1 capsule by mouth daily 90 capsule 1    valACYclovir (VALTREX) 500 MG tablet Take 1 tablet by mouth 2 times daily 30 tablet 0    predniSONE (DELTASONE) 20 MG tablet TAKE ONE TABLET BY MOUTH DAILY AS NEEDED 30 tablet 0    meloxicam (MOBIC) 15 MG tablet TAKE ONE TABLET BY MOUTH DAILY 30 tablet 5    albuterol sulfate HFA (VENTOLIN HFA) 108 (90 Base) MCG/ACT inhaler Inhale 2 puffs into the lungs every 6 hours as needed for Wheezing 1 Inhaler 5    Multiple Vitamin (MULTIVITAMIN ADULT PO) multivitamin   one daily (Patient not taking: Reported on 11/3/2021)      acyclovir (ZOVIRAX) 200 MG capsule acyclovir 200 mg capsule      Cetirizine HCl 10 MG CAPS cetirizine 10 mg capsule   Take 1 capsule every day by oral route.  meloxicam (MOBIC) 7.5 MG tablet Take by mouth  (Patient not taking: Reported on 3/11/2022)      triamcinolone (ARISTOCORT) 0.5 % cream APPLY TO AFFECTED AREA(S) DAILY (Patient not taking: Reported on 11/3/2021) 15 g 2    triamcinolone (NASACORT ALLERGY 24HR) 55 MCG/ACT nasal inhaler Nasacort 55 mcg nasal spray aerosol   Take 2 sprays every day by nasal route. No current facility-administered medications for this visit. Allergies   Allergen Reactions    Black Pepper-Turmeric Shortness Of Breath    Aspirin     Codeine     Fluoride Preparations     Penicillins     Piper     Sulfa Antibiotics          Review of Systems   Constitutional: Negative for activity change, appetite change, chills, diaphoresis, fatigue, fever and unexpected weight change. HENT: Negative for congestion, ear pain, facial swelling, hearing loss and sneezing. Eyes: Negative for photophobia, pain and redness. Respiratory: Negative for cough, shortness of breath, wheezing and stridor. Cardiovascular: Negative for chest pain, palpitations and leg swelling. Gastrointestinal: Negative for abdominal pain, nausea and vomiting. Endocrine: Negative for cold intolerance and heat intolerance. Musculoskeletal: Positive for myalgias. Negative for arthralgias, back pain, gait problem, joint swelling, neck pain and neck stiffness. Skin: Negative for color change, pallor, rash and wound. Neurological: Negative for dizziness, facial asymmetry, weakness and numbness.                                                    Examination:  General Exam:  Vitals: /81   Pulse 66   Resp 17   Ht 5' 3.5\" (1.613 m)   Wt 170 lb (77.1 kg)   SpO2 99%   BMI 29.64 kg/m²    Physical Exam  Constitutional:       Appearance: Normal appearance. She is normal weight. HENT:      Head: Normocephalic and atraumatic. Nose: Nose normal.   Eyes:      General:         Right eye: No discharge. Left eye: No discharge. Extraocular Movements: Extraocular movements intact. Cardiovascular:      Pulses: Normal pulses. Pulmonary:      Effort: Pulmonary effort is normal.      Breath sounds: Normal breath sounds. Musculoskeletal:         General: Tenderness present. No swelling, deformity or signs of injury. Right shoulder: Normal.      Left shoulder: Normal. No swelling, deformity, effusion, laceration, tenderness, bony tenderness or crepitus. Normal range of motion. Normal strength. Normal pulse. Left upper arm: Tenderness and bony tenderness present. No swelling, edema, deformity or lacerations. Right elbow: Normal.      Left elbow: Normal.      Right forearm: Normal.      Left forearm: Normal.      Right wrist: Normal.      Left wrist: Normal.      Cervical back: Normal and normal range of motion. No rigidity or tenderness. Skin:     General: Skin is warm and dry. Capillary Refill: Capillary refill takes less than 2 seconds. Findings: No bruising. Neurological:      General: No focal deficit present. Mental Status: She is alert and oriented to person, place, and time. LEFT SHOULDER / UPPER EXTREMITY EXAM      OBSERVATION:      Swelling: None  deformity: none    Discoloration: none   Scapular winging: none    Scars: none    Atrophy: none      TENDERNESS / CREPITUS (T/C):      Clavicle -/-    SUPRAspinatus  -/-     AC Jt. -/-    INFRAspinatus -/-     SC Jt. -/-    Deltoid -/-     G.  Tuberosity +/-   LH BICEP groove +/-    Acromion: -/-    Midline Neck -/-     Scapular Spine -/-   Trapezium -/-    SMA Scapula -/-   GH jt. line - post -/-     Scapulothoracic -/-   GH jt. line - ant +/-         ROM: (* = with pain)  Left shoulder   Right shoulder AROM (PROM)  AROM (PROM)    FE   170° (175°)    170° (175°)     ER 0°   60° (65°)   60° (65°)     ER 90° ABD  90° (90°)   90°  (90°)    IR 90° ABD  40(40°)   40  (40°)     IR (spine) T12   T10      STRENGTH: (* = with pain) Left shoulder   Right shoulder    SCAPTION   5-/5    5/5     IR    5/5    5/5     ER    5/5    5/5     BICEPS   5/5    5/5     Deltoid   5/5    5/5       SIGNS:  LUE     NEER: Positive but improved      OPRASADS: Positive but improved     SAHU: Positive  SPEEDS: Positive    DROP ARM: neg  BELLY PRESS: neg    LIFT-OFF: neg  Superior escape: none     X-Body ADD: Positive  MOVING VALGUS: neg     CRANK: neg   Bear Hug: neg    Biceps stretch test: Positive but improved      EXTREMITY NEURO-VASCULAR EXAM:     Sensation grossly intact to light touch all dermatomal regions. DTR 2+ Biceps, Triceps, BR and Negative Cindis sign    Grossly intact motor function at Elbow, Wrist and Hand    Distal pulses radial and ulnar 2+, brisk cap refill, symmetric. NECK:  Painless FROM and spinous processes non-tender. Negative Spurlings sign. Diagnostic testing:  No new orthopedic imaging    Previous imaging:  X-ray images were reviewed by myself and discussed with the patient:  2 view of the left humerus in a skeletally mature patient shows no acute osseous abnormalities. Alignment is appropriate. No issues related to the glenohumeral or elbow joint. No sign of any soft tissue avulsion type injury. No osseous lesions. Soft tissue swelling seen at midshaft of humerus. Office Procedures:  No orders of the defined types were placed in this encounter.   Left subacromial Shoulder Injection Procedure Note   Pre-operative Diagnosis: Left rotator cuff tendinitis   Post-operative Diagnosis: same   Indications: Symptomatic relief of tendinitis   Anesthesia: Lidocaine 1% and marcaine 0.5% without epinephrine without added sodium bicarbonate   Procedure Details   Verbal consent was obtained for the procedure. The shoulder was prepped with alcohol. A 22 gauge needle was advanced into the subacromial space through posterior approach without difficulty The space was then injected with 1 ml 1% lidocaine and 1 ml 0.5% marcaine and 1 ml of triamcinolone (KENALOG) 40mg/ml. The injection site was cleansed with isopropyl alcohol and a dressing was applied. Complications: None; patient tolerated the procedure well. Symptoms were improved immediately after the injection. Assessment and Plan    A: Left shoulder rotator cuff tendinitis, biceps tendinitis    P:   I again had a thorough conversation the patient regarding her left shoulder pain and treatment course. I explained that because she is done so well with previous injections and her pain has returned, although improved, I do feel that injection is appropriate today. A left shoulder subacromial injection was performed out complication and patient had immediate pain relief. Patient will follow-up in 3 months for reevaluation and possible additional cortisone injection. She should continue over-the-counter anti-inflammatories and ice for pain control. She should also continue her home exercise program to work on range of motion and strengthening which I explained today continue to improve. All questions were answered and patient voices her understanding.     Electronically signed by Anderson Pastrana DO on 7/12/2022 at 3:03 PM

## 2022-11-07 ENCOUNTER — OFFICE VISIT (OUTPATIENT)
Dept: ORTHOPEDIC SURGERY | Age: 62
End: 2022-11-07
Payer: COMMERCIAL

## 2022-11-07 VITALS — OXYGEN SATURATION: 99 % | SYSTOLIC BLOOD PRESSURE: 128 MMHG | DIASTOLIC BLOOD PRESSURE: 77 MMHG | HEART RATE: 62 BPM

## 2022-11-07 DIAGNOSIS — S46.212A STRAIN OF LEFT BICEPS, INITIAL ENCOUNTER: ICD-10-CM

## 2022-11-07 DIAGNOSIS — M75.82 ROTATOR CUFF TENDINITIS, LEFT: Primary | ICD-10-CM

## 2022-11-07 PROCEDURE — 20610 DRAIN/INJ JOINT/BURSA W/O US: CPT | Performed by: STUDENT IN AN ORGANIZED HEALTH CARE EDUCATION/TRAINING PROGRAM

## 2022-11-07 ASSESSMENT — ENCOUNTER SYMPTOMS
VOMITING: 0
EYE REDNESS: 0
STRIDOR: 0
COLOR CHANGE: 0
BACK PAIN: 0
WHEEZING: 0
SHORTNESS OF BREATH: 0
COUGH: 0
ABDOMINAL PAIN: 0
NAUSEA: 0
PHOTOPHOBIA: 0
FACIAL SWELLING: 0
EYE PAIN: 0

## 2022-11-07 NOTE — PROGRESS NOTES
11/7/2022   Chief Complaint   Patient presents with    Injections     Patient is wanting another injection in her left shoulder       Updated HPI: Patient is here for reevaluation of her left shoulder. Patient states that she had near 100% relief of her left shoulder pain and issues with impingement for approximately 3 months after the injection but almost immediately at the 3-month donal the pain returned and was significant. She is able to do full range of motion with minimal pain with extremes in range of motion especially with forward flexion. She continues with impingement signs. No new injuries or complaints since last being seen. Complains of issues with shoulder endurance and some strength but overall the issues with pain. No changes in her health history. No complications from previous injection. Denies any constitutional symptoms today. Previous HPI (7/12/2022): Patient is here for reevaluation of her left shoulder pain. Patient states she continues with excellent relief from previous injections and she would like another one today if possible. She states that she had a essentially full relief of her symptoms for over a month with the last 1 and significantly improved pain since then. She would like a new injection today. No new injuries or complaint since last being seen. She continues with home exercise program intermittently. Previous HPI (3/11/2022): Patient is here for reevaluation of her left shoulder pain. Patient states she had excellent relief from the previous injection given 3 months prior and is requesting additional injection today. She states that she has been doing well with physical therapy but had to complete it due to the expenses. She continued with the exercises taught in therapy as well as the home exercises provided by us. Pain is currently 0 out of 10 but continues to have some pain in the anterior shoulder when increasing her activity.   No new complaints at this time. Previous HPI (11/30/2021): Patient is here for reevaluation of her left humerus. She states the pain over the biceps has significantly improved since last being seen but she states that she has increasing pain at the rotator cuff insertion of the left shoulder. She especially has pain with overhead activities. She states that she feels that she has been using the arm differently trying to avoid using the biceps over the last several weeks since being seen. She has no new injury or complaint otherwise. Previous HPI (11-3-2021  ):                    Demario Luciano is a 63 y.o. female right handed patient referred by self for evaluation and treatment left biceps muscle belly pain. This is evaluated as a personal injury. Patient states that approximately 2 weeks prior she struck a metal fence post with the midportion of her left arm. She had immediate pain and swelling over the biceps muscle belly in this area. Over the following several days she had significant ecchymosis in the area. She was able to continue using the arm although she does have some pain with supination. She was not planning on seeking treatment but due to previous injuries in the left shoulder she was encouraged by her family to be seen and evaluated. She has no other complaints at this time. She has been taking a muscle relaxer as well as Mobic for the pain states that these do provide moderate pain relief. She is here today for first visit with me. The pain occurs every day and when active. Location of pain is left biceps muscle belly at the midportion of the humerus. No history of shoulder separation, subluxation or dislocation. No known prior left biceps or humerus injury. Symptoms are aggravated by ADL's, repetitive use, work at or above shoulder height or behind body, difficulty sleeping on affected side. Symptoms are diminished by rest, avoiding the painful activities.      Limited activities include: lifting, repetitive use, work at or above shoulder height or behind body, difficulty sleeping, difficulty with ADLs. No stiffness is reported. Related history: negative for prior surgery, trauma, arthritis or disorders. Is affecting ADLs. Pain is 7/10 at it's worst.    Outside reports reviewed: none. MA HPI: Patient is in the office with left mid arm pain in the humerus. Patient states that she fell into a metal fence post around 10/17/2021 and might have injured the arm. Patient states that on the 23rd the bruising was increased when she was trying to lift a 40 pound of bird seed. Patient state eliseo murrieta has been taking a muscle relaxer, Mobic, and Tylenol. Patient's medications, allergies, past medical, surgical, social and family histories were reviewed and updated as appropriate. Patient has not previously attempted CSI, PT, NSAIDs for pain relief    Medical History  Patient's medications, allergies, past medical, surgical, social and family histories were reviewed and updated as appropriate.     Past Medical History:   Diagnosis Date    Acquired hypothyroidism     Anxiety     Arthritis     Asthma     Hand dermatitis     Hypertension     Scoliosis      Past Surgical History:   Procedure Laterality Date    TOTAL ABDOMINAL HYSTERECTOMY      Still has 1 ovary     Family History   Problem Relation Age of Onset    Rheum Arthritis Mother     COPD Mother     Thyroid Cancer Mother     COPD Father      Social History     Socioeconomic History    Marital status: Single    Number of children: 2   Occupational History    Occupation: Works [employer]. DD   Tobacco Use    Smoking status: Never    Smokeless tobacco: Never   Vaping Use    Vaping Use: Never used   Substance and Sexual Activity    Alcohol use: Not Currently    Drug use: Never     Current Outpatient Medications   Medication Sig Dispense Refill    furosemide (LASIX) 20 MG tablet Take 1 tablet by mouth daily 30 tablet 1    hydroCHLOROthiazide (HYDRODIURIL) 25 MG tablet Take 1 tablet by mouth daily 30 tablet 1    atenolol (TENORMIN) 25 MG tablet Take 1 tablet by mouth daily 30 tablet 1    montelukast (SINGULAIR) 10 MG tablet Take 1 tablet by mouth nightly 30 tablet 1    atorvastatin (LIPITOR) 10 MG tablet Take 1 tablet by mouth daily 30 tablet 1    levothyroxine (SYNTHROID) 75 MCG tablet Take 1 tablet by mouth Daily 30 tablet 1    tiZANidine (ZANAFLEX) 4 MG tablet TAKE ONE TABLET BY MOUTH EVERY 6 HOURS AS NEEDED FOR BACK PAIN 30 tablet 0    minocycline (MINOCIN;DYNACIN) 100 MG capsule Take 1 capsule by mouth daily 30 capsule 0    SYMBICORT 80-4.5 MCG/ACT AERO Inhale 2 puffs into the lungs 2 times daily 1 each 1    FLUoxetine (PROZAC) 20 MG capsule Take 1 capsule by mouth daily (Patient not taking: Reported on 7/12/2022) 90 capsule 1    valACYclovir (VALTREX) 500 MG tablet Take 1 tablet by mouth 2 times daily 30 tablet 0    predniSONE (DELTASONE) 20 MG tablet TAKE ONE TABLET BY MOUTH DAILY AS NEEDED 30 tablet 0    meloxicam (MOBIC) 15 MG tablet TAKE ONE TABLET BY MOUTH DAILY 30 tablet 5    albuterol sulfate HFA (VENTOLIN HFA) 108 (90 Base) MCG/ACT inhaler Inhale 2 puffs into the lungs every 6 hours as needed for Wheezing 1 Inhaler 5    Multiple Vitamin (MULTIVITAMIN ADULT PO) multivitamin   one daily (Patient not taking: Reported on 11/3/2021)      acyclovir (ZOVIRAX) 200 MG capsule acyclovir 200 mg capsule      Cetirizine HCl 10 MG CAPS cetirizine 10 mg capsule   Take 1 capsule every day by oral route. meloxicam (MOBIC) 7.5 MG tablet Take by mouth  (Patient not taking: Reported on 3/11/2022)      triamcinolone (ARISTOCORT) 0.5 % cream APPLY TO AFFECTED AREA(S) DAILY (Patient not taking: Reported on 11/3/2021) 15 g 2    triamcinolone (NASACORT ALLERGY 24HR) 55 MCG/ACT nasal inhaler Nasacort 55 mcg nasal spray aerosol   Take 2 sprays every day by nasal route. No current facility-administered medications for this visit.      Allergies   Allergen Reactions    Black Pepper-Turmeric Shortness Of Breath    Aspirin     Codeine     Fluoride Preparations     Penicillins     Piper     Sulfa Antibiotics          Review of Systems   Constitutional:  Negative for activity change, appetite change, chills, diaphoresis, fatigue, fever and unexpected weight change. HENT:  Negative for congestion, ear pain, facial swelling, hearing loss and sneezing. Eyes:  Negative for photophobia, pain and redness. Respiratory:  Negative for cough, shortness of breath, wheezing and stridor. Cardiovascular:  Negative for chest pain, palpitations and leg swelling. Gastrointestinal:  Negative for abdominal pain, nausea and vomiting. Endocrine: Negative for cold intolerance and heat intolerance. Musculoskeletal:  Positive for myalgias. Negative for arthralgias, back pain, gait problem, joint swelling, neck pain and neck stiffness. Skin:  Negative for color change, pallor, rash and wound. Neurological:  Negative for dizziness, facial asymmetry, weakness and numbness. Examination:  General Exam:  Vitals: /77 (Site: Right Wrist, Position: Sitting)   Pulse 62   SpO2 99%    Physical Exam  Constitutional:       Appearance: Normal appearance. She is normal weight. HENT:      Head: Normocephalic and atraumatic. Nose: Nose normal.   Eyes:      General:         Right eye: No discharge. Left eye: No discharge. Extraocular Movements: Extraocular movements intact. Cardiovascular:      Pulses: Normal pulses. Pulmonary:      Effort: Pulmonary effort is normal.      Breath sounds: Normal breath sounds. Musculoskeletal:         General: Tenderness present. No swelling, deformity or signs of injury. Right shoulder: Normal.      Left shoulder: Normal. No swelling, deformity, effusion, laceration, tenderness, bony tenderness or crepitus. Normal range of motion. Normal strength. Normal pulse.       Left upper arm: Tenderness and bony tenderness present. No swelling, edema, deformity or lacerations. Right elbow: Normal.      Left elbow: Normal.      Right forearm: Normal.      Left forearm: Normal.      Right wrist: Normal.      Left wrist: Normal.      Cervical back: Normal and normal range of motion. No rigidity or tenderness. Skin:     General: Skin is warm and dry. Capillary Refill: Capillary refill takes less than 2 seconds. Findings: No bruising. Neurological:      General: No focal deficit present. Mental Status: She is alert and oriented to person, place, and time. LEFT SHOULDER / UPPER EXTREMITY EXAM      OBSERVATION:      Swelling: None  deformity: none    Discoloration: none   Scapular winging: none    Scars: none    Atrophy: none      TENDERNESS / CREPITUS (T/C):      Clavicle -/-    SUPRAspinatus  -/-     AC Jt. -/-    INFRAspinatus -/-     SC Jt. -/-    Deltoid -/-     G.  Tuberosity +/-   LH BICEP groove +/-    Acromion: -/-    Midline Neck -/-     Scapular Spine -/-   Trapezium -/-    SMA Scapula -/-   GH jt. line - post -/-     Scapulothoracic -/-   GH jt. line - ant +/-         ROM: (* = with pain)  Left shoulder   Right shoulder     AROM (PROM)  AROM (PROM)    FE   170° (175°) *   170° (175°)     ER 0°   60° (65°)   60° (65°)     ER 90° ABD  90° (90°)   90°  (90°)    IR 90° ABD  40(40°)   40  (40°)     IR (spine) T12   T10      STRENGTH: (* = with pain) Left shoulder   Right shoulder    SCAPTION   5-/5*    5/5     IR    5/5    5/5     ER    5/5    5/5     BICEPS   5/5    5/5     Deltoid   5/5    5/5       SIGNS:  LUE     NEER: Positive but improved      OPRASADS: Positive but improved     SAHU: Positive  SPEEDS: Positive    DROP ARM: neg  BELLY PRESS: neg    LIFT-OFF: neg      X-Body ADD: Positive      CRANK: neg   Bear Hug: neg    Biceps stretch test: Positive but improved      EXTREMITY NEURO-VASCULAR EXAM:     Sensation grossly intact to light touch all dermatomal regions. DTR 2+ Biceps, Triceps, BR and Negative Cindis sign    Grossly intact motor function at Elbow, Wrist and Hand    Distal pulses radial and ulnar 2+, brisk cap refill, symmetric. NECK:  Painless FROM and spinous processes non-tender. Negative Spurlings sign. Diagnostic testing:  No new orthopedic imaging    Previous imaging:  X-ray images were reviewed by myself and discussed with the patient:  2 view of the left humerus in a skeletally mature patient shows no acute osseous abnormalities. Alignment is appropriate. No issues related to the glenohumeral or elbow joint. No sign of any soft tissue avulsion type injury. No osseous lesions. Soft tissue swelling seen at midshaft of humerus. Office Procedures:  No orders of the defined types were placed in this encounter. Left subacromial Shoulder Injection Procedure Note   Pre-operative Diagnosis: Left rotator cuff tendinitis   Post-operative Diagnosis: same   Indications: Symptomatic relief of tendinitis   Anesthesia: Lidocaine 1% and marcaine 0.5% without epinephrine without added sodium bicarbonate   Procedure Details   Verbal consent was obtained for the procedure. The shoulder was prepped with alcohol. A 22 gauge needle was advanced into the subacromial space through posterior approach without difficulty The space was then injected with 1 ml 1% lidocaine and 1 ml 0.5% marcaine and 1 ml of triamcinolone (KENALOG) 40mg/ml. The injection site was cleansed with isopropyl alcohol and a dressing was applied. Complications: None; patient tolerated the procedure well. Symptoms were improved immediately after the injection. Assessment and Plan    A: Left shoulder rotator cuff tendinitis, biceps tendinitis    P:   I had a thorough conversation with the patient regarding her left shoulder physical exam findings as well as treatment course.   I explained that because she is gotten such a relief from the previous injections and they have lasted the full 3-month. I do feel that doing another 1 today is acceptable. However, I think we should reconsider if she starts getting less than 3 months relief or significant drop-off in the percentage of relief or she feels that overall her range of motion or strength are getting worse or at least plateauing but neither of these issues are apparent today. Patient was given the left shoulder cortisone injection subacromial approach without complication she did have immediate pain relief. She will follow-up in 3 months for reevaluation and again if she is no better or worse we will discuss MRI to better evaluate the shoulder but if she is continue to get significant relief from the injection we will continue with cortisone injections as needed. I also did explain to her that I do not want to do injections for a long-term amount of time as this can thin out the rotator cuff tissue and this is something we need to discuss after the next injection is performed and she voices her understanding. She was also given a home exercise program again today to work on range of motion and strengthening and she states she is in the market to buy some workout equipment to work on this issue as she admits that she has been bad about not keeping up with her shoulder strengthening and would like to do this before considering any type of surgery. Patient is weightbearing and range of motion as tolerated. All questions were answered and patient voices understanding. To continue ice and over-the-counter pain medications for pain control.       Electronically signed by Hernan Lezama DO on 11/7/2022 at 9:48 AM

## 2022-11-07 NOTE — PROGRESS NOTES
Patient is here for another injection in left shoulder. Patient states previous one was effective. Patient denies new injury. Patient is asking how long she can have repeat injections or if there are long term effects of the injections. Last injection note    Left subacromial Shoulder Injection Procedure Note   Pre-operative Diagnosis: Left rotator cuff tendinitis   Post-operative Diagnosis: same   Indications: Symptomatic relief of tendinitis   Anesthesia: Lidocaine 1% and marcaine 0.5% without epinephrine without added sodium bicarbonate   Procedure Details   Verbal consent was obtained for the procedure. The shoulder was prepped with alcohol. A 22 gauge needle was advanced into the subacromial space through posterior approach without difficulty The space was then injected with 1 ml 1% lidocaine and 1 ml 0.5% marcaine and 1 ml of triamcinolone (KENALOG) 40mg/ml. The injection site was cleansed with isopropyl alcohol and a dressing was applied. Complications: None; patient tolerated the procedure well. Symptoms were improved immediately after the injection.     Assessment and Plan    A: Left shoulder rotator cuff tendinitis, biceps tendinitis

## 2022-12-14 ENCOUNTER — TELEPHONE (OUTPATIENT)
Dept: ORTHOPEDIC SURGERY | Age: 62
End: 2022-12-14

## 2023-01-01 NOTE — PROGRESS NOTES
Physical Therapy      Outpatient Physical Therapy           [place]           [x] Phone: [phone]   Fax: [phone]  [facility]           [] Phone: [phone]   Fax: [phone]      To:   Dr. Fabienne Orellana   From: Anthony Langston, PT, PT     Patient: Maria E Ghotra                  : 1960  Diagnosis:         Date: 2022  Treatment Diagnosis:     Rotator cuff tendonitis, left,  L shoulder pain, abnormal posture. [x]  Progress Note                []  Discharge Note    Evaluation Date:  21  Total Visits to date:   2 Cancels/No-shows to date:      Subjective:    Talib Yousif states that she is now able to lift her arm above shoulder level. Her goal is to continue to move the RUE overhead without an increase in pain. Talib Yousif reports some mild shoulder pain (5/10) when moving the arm above shoulder height. She reports that her pain is nothing like it was when she last came into PT. Talib Yousif states that she was completing some of the neck exercises from the initial evaluation and reports that she is no longer having tingling in her fingers as frequently. It may still occur if she drives in the car for a few hours. She reports that everyday she tries to see how high she can move her arm.       Talib Yousif states that she did reach out to her MD about an MRI, however has not heard back from them.     Plan of Care/Treatment to date:  [x] Therapeutic Exercise    [x] Modalities:  [x] Therapeutic Activity     [] Ultrasound  [x] Electrical Stimulation  [] Gait Training      [] Cervical Traction   [] Lumbar Traction  [x] Neuromuscular Re-education  [x] Cold/hotpack [] Iontophoresis  [x] Instruction in HEP      Other:  [x] Manual Therapy       [x]  Vasopneumatic  [] Aquatic Therapy       []   Dry Needle Therapy                      Objective/Significant Findings At Last Visit/Comments:       Strength LUE  Strength LUE: Exception  L Shoulder Flexion: 4-/5 (painful)  L Shoulder ABduction: 5/5  L Shoulder Internal Rotation: 4+/5  L Shoulder External Rotation: 4-/5        Strength Other  Additional Measures  Special Tests: Min (-)  Larisa heller (+)  Sensation  Overall Sensation status: normal sensation in L hand today        LUE AROM : Exceptions  L Shoulder Flexion 0-180:  118  L Shoulder ABduction 0-180:  155  L Shoulder Int Rotation  0-70: Level of T10  L Shoulder Ext Rotation  0-90: Level of C5 no pain     L shoulder PROM  L shoulder flexion  165 degrees  L shoulder abduction 170 degrees  L shoulder IR  75 degrees  L shoulder ER  75 degrees     Glenohumeral joint mobility  Hypomobile A-P glide       Assessment:     Progress Note:  Korina Crouch continues to work towards her goals in physical therapy. Since the initial evaluation, she does present with slightly improved L shoulder AROM. She continues to present with deficits in strength  L shoulder flexion, abduction and ER, however. Korina Crouch will continue to benefit from skilled therapeutic intervention in this setting to decrease pain and improve overall functional mobility. Goal Status:  [] Achieved [x] Partially Achieved  [] Not Achieved     Changes to goals:    Patient goals : Improve use of L arm  Short term goals  Time Frame for Short term goals: 4 weeks  Short term goal 1: Patient will report compliance with HEP. Ongoing  Short term goal 2: Patient will report a decrease in radiculopathy symptoms in the L hand. Met  Short term goal 3: Patient will present with improved L shoulder AROM by 10 degrees in flexion and abduction. Ongoing  Short term goal 4: Patient will report improved ability to don clothing. Ongoing  Long term goals  Time Frame for Long term goals : 8 weeks  Long term goal 1: Patient will present with an improved score on the QuickDASH from 50% impaired to 40% impaired. Ongoing  Long term goal 2: Patient will present with improved  L shoulder strength by 1/2 grade on MMT scale.  Ongoing        Patient Status: [x] Continue per initial plan of Care     [] Patient now discharged     [] Additional visits requested, Please re-certify for additional visits: If we are requesting more visits, we fully anticipate the patient's condition is expected to improve within the treatment timeframe we are requesting. Electronically signed by:  Solange Brar, PT   1/31/2022, 5:54 PM    If you have any questions or concerns, please don't hesitate to call.   Thank you for your referral.    Physician Signature:______________________ Date:______ Time: ________  By signing above, therapists plan is approved by physician none

## 2023-02-10 ENCOUNTER — OFFICE VISIT (OUTPATIENT)
Dept: ORTHOPEDIC SURGERY | Age: 63
End: 2023-02-10

## 2023-02-10 VITALS — DIASTOLIC BLOOD PRESSURE: 78 MMHG | HEART RATE: 72 BPM | OXYGEN SATURATION: 98 % | SYSTOLIC BLOOD PRESSURE: 129 MMHG

## 2023-02-10 DIAGNOSIS — M75.82 ROTATOR CUFF TENDONITIS, LEFT: Primary | ICD-10-CM

## 2023-02-10 ASSESSMENT — ENCOUNTER SYMPTOMS
ABDOMINAL PAIN: 0
VOMITING: 0
EYE REDNESS: 0
COUGH: 0
BACK PAIN: 0
WHEEZING: 0
EYE PAIN: 0
COLOR CHANGE: 0
NAUSEA: 0
PHOTOPHOBIA: 0
FACIAL SWELLING: 0
SHORTNESS OF BREATH: 0
STRIDOR: 0

## 2023-02-10 NOTE — PROGRESS NOTES
Patient, 63year old female, is here for 3 month follow up on left shoulder injection for left rotator cuff tendinitis. Patient denies new injury, numbness or tingling. Patient states her pain is 4/10 today and is wanting another injection.

## 2023-02-10 NOTE — PROGRESS NOTES
2/10/2023   Chief Complaint   Patient presents with    Shoulder Pain     Left shoulder injection 11/7/22      Updated HPI: Patient is here for reevaluation of her left shoulder. She states overall she continues to improve. She has been doing exercises vigorously and states that she feels that her strength and range of motion have improved and her pain is lessened. No new injuries or complaints. Previous HPI (11/7/2022): Patient is here for reevaluation of her left shoulder. Patient states that she had near 100% relief of her left shoulder pain and issues with impingement for approximately 3 months after the injection but almost immediately at the 3-month donal the pain returned and was significant. She is able to do full range of motion with minimal pain with extremes in range of motion especially with forward flexion. She continues with impingement signs. No new injuries or complaints since last being seen. Complains of issues with shoulder endurance and some strength but overall the issues with pain. No changes in her health history. No complications from previous injection. Denies any constitutional symptoms today. Previous HPI (7/12/2022): Patient is here for reevaluation of her left shoulder pain. Patient states she continues with excellent relief from previous injections and she would like another one today if possible. She states that she had a essentially full relief of her symptoms for over a month with the last 1 and significantly improved pain since then. She would like a new injection today. No new injuries or complaint since last being seen. She continues with home exercise program intermittently. Previous HPI (3/11/2022): Patient is here for reevaluation of her left shoulder pain. Patient states she had excellent relief from the previous injection given 3 months prior and is requesting additional injection today.   She states that she has been doing well with physical therapy but had to complete it due to the expenses. She continued with the exercises taught in therapy as well as the home exercises provided by us. Pain is currently 0 out of 10 but continues to have some pain in the anterior shoulder when increasing her activity. No new complaints at this time. Previous HPI (11/30/2021): Patient is here for reevaluation of her left humerus. She states the pain over the biceps has significantly improved since last being seen but she states that she has increasing pain at the rotator cuff insertion of the left shoulder. She especially has pain with overhead activities. She states that she feels that she has been using the arm differently trying to avoid using the biceps over the last several weeks since being seen. She has no new injury or complaint otherwise. Previous HPI (11-3-2021  ):                    Kacy Hernandez is a 63 y.o. female right handed patient referred by self for evaluation and treatment left biceps muscle belly pain. This is evaluated as a personal injury. Patient states that approximately 2 weeks prior she struck a metal fence post with the midportion of her left arm. She had immediate pain and swelling over the biceps muscle belly in this area. Over the following several days she had significant ecchymosis in the area. She was able to continue using the arm although she does have some pain with supination. She was not planning on seeking treatment but due to previous injuries in the left shoulder she was encouraged by her family to be seen and evaluated. She has no other complaints at this time. She has been taking a muscle relaxer as well as Mobic for the pain states that these do provide moderate pain relief. She is here today for first visit with me. The pain occurs every day and when active. Location of pain is left biceps muscle belly at the midportion of the humerus. No history of shoulder separation, subluxation or dislocation. No known prior left biceps or humerus injury. Symptoms are aggravated by ADL's, repetitive use, work at or above shoulder height or behind body, difficulty sleeping on affected side. Symptoms are diminished by rest, avoiding the painful activities. Limited activities include: lifting, repetitive use, work at or above shoulder height or behind body, difficulty sleeping, difficulty with ADLs. No stiffness is reported. Related history: negative for prior surgery, trauma, arthritis or disorders. Is affecting ADLs. Pain is 7/10 at it's worst.    Outside reports reviewed: none. MA HPI: Patient is in the office with left mid arm pain in the humerus. Patient states that she fell into a metal fence post around 10/17/2021 and might have injured the arm. Patient states that on the 23rd the bruising was increased when she was trying to lift a 40 pound of bird seed. Patient state eliseo murrieta has been taking a muscle relaxer, Mobic, and Tylenol. Patient's medications, allergies, past medical, surgical, social and family histories were reviewed and updated as appropriate. Patient has not previously attempted CSI, PT, NSAIDs for pain relief    Medical History  Patient's medications, allergies, past medical, surgical, social and family histories were reviewed and updated as appropriate.     Past Medical History:   Diagnosis Date    Acquired hypothyroidism     Anxiety     Arthritis     Asthma     Hand dermatitis     Hypertension     Scoliosis      Past Surgical History:   Procedure Laterality Date    TOTAL ABDOMINAL HYSTERECTOMY      Still has 1 ovary     Family History   Problem Relation Age of Onset    Rheum Arthritis Mother     COPD Mother     Thyroid Cancer Mother     COPD Father      Social History     Socioeconomic History    Marital status: Single    Number of children: 2   Occupational History    Occupation: [employer]. [employer]   Tobacco Use    Smoking status: Never    Smokeless tobacco: Never   Vaping Use    Vaping Use: Never used   Substance and Sexual Activity    Alcohol use: Not Currently    Drug use: Never     Current Outpatient Medications   Medication Sig Dispense Refill    furosemide (LASIX) 20 MG tablet Take 1 tablet by mouth daily 30 tablet 1    hydroCHLOROthiazide (HYDRODIURIL) 25 MG tablet Take 1 tablet by mouth daily 30 tablet 1    atenolol (TENORMIN) 25 MG tablet Take 1 tablet by mouth daily 30 tablet 1    montelukast (SINGULAIR) 10 MG tablet Take 1 tablet by mouth nightly 30 tablet 1    atorvastatin (LIPITOR) 10 MG tablet Take 1 tablet by mouth daily 30 tablet 1    levothyroxine (SYNTHROID) 75 MCG tablet Take 1 tablet by mouth Daily 30 tablet 1    tiZANidine (ZANAFLEX) 4 MG tablet TAKE ONE TABLET BY MOUTH EVERY 6 HOURS AS NEEDED FOR BACK PAIN 30 tablet 0    minocycline (MINOCIN;DYNACIN) 100 MG capsule Take 1 capsule by mouth daily 30 capsule 0    SYMBICORT 80-4.5 MCG/ACT AERO Inhale 2 puffs into the lungs 2 times daily 1 each 1    FLUoxetine (PROZAC) 20 MG capsule Take 1 capsule by mouth daily (Patient not taking: Reported on 7/12/2022) 90 capsule 1    valACYclovir (VALTREX) 500 MG tablet Take 1 tablet by mouth 2 times daily 30 tablet 0    predniSONE (DELTASONE) 20 MG tablet TAKE ONE TABLET BY MOUTH DAILY AS NEEDED 30 tablet 0    meloxicam (MOBIC) 15 MG tablet TAKE ONE TABLET BY MOUTH DAILY 30 tablet 5    albuterol sulfate HFA (VENTOLIN HFA) 108 (90 Base) MCG/ACT inhaler Inhale 2 puffs into the lungs every 6 hours as needed for Wheezing 1 Inhaler 5    Multiple Vitamin (MULTIVITAMIN ADULT PO) multivitamin   one daily (Patient not taking: Reported on 11/3/2021)      acyclovir (ZOVIRAX) 200 MG capsule acyclovir 200 mg capsule      Cetirizine HCl 10 MG CAPS cetirizine 10 mg capsule   Take 1 capsule every day by oral route.       meloxicam (MOBIC) 7.5 MG tablet Take by mouth  (Patient not taking: Reported on 3/11/2022)      triamcinolone (ARISTOCORT) 0.5 % cream APPLY TO AFFECTED AREA(S) DAILY (Patient not taking: Reported on 11/3/2021) 15 g 2    triamcinolone (NASACORT ALLERGY 24HR) 55 MCG/ACT nasal inhaler Nasacort 55 mcg nasal spray aerosol   Take 2 sprays every day by nasal route. No current facility-administered medications for this visit. Allergies   Allergen Reactions    Black Pepper-Turmeric Shortness Of Breath    Aspirin     Codeine     Fluoride Preparations     Penicillins     Piper     Sulfa Antibiotics          Review of Systems   Constitutional:  Negative for activity change, appetite change, chills, diaphoresis, fatigue, fever and unexpected weight change. HENT:  Negative for congestion, ear pain, facial swelling, hearing loss and sneezing. Eyes:  Negative for photophobia, pain and redness. Respiratory:  Negative for cough, shortness of breath, wheezing and stridor. Cardiovascular:  Negative for chest pain, palpitations and leg swelling. Gastrointestinal:  Negative for abdominal pain, nausea and vomiting. Endocrine: Negative for cold intolerance and heat intolerance. Musculoskeletal:  Positive for myalgias. Negative for arthralgias, back pain, gait problem, joint swelling, neck pain and neck stiffness. Skin:  Negative for color change, pallor, rash and wound. Neurological:  Negative for dizziness, facial asymmetry, weakness and numbness. Examination:  General Exam:  Vitals: There were no vitals taken for this visit. Physical Exam  Constitutional:       Appearance: Normal appearance. She is normal weight. HENT:      Head: Normocephalic and atraumatic. Nose: Nose normal.   Eyes:      General:         Right eye: No discharge. Left eye: No discharge. Extraocular Movements: Extraocular movements intact. Cardiovascular:      Pulses: Normal pulses. Pulmonary:      Effort: Pulmonary effort is normal.      Breath sounds: Normal breath sounds. Musculoskeletal:         General: Tenderness present.  No swelling, deformity or signs of injury. Right shoulder: Normal.      Left shoulder: Normal. No swelling, deformity, effusion, laceration, tenderness, bony tenderness or crepitus. Normal range of motion. Normal strength. Normal pulse. Left upper arm: Tenderness and bony tenderness present. No swelling, edema, deformity or lacerations. Right elbow: Normal.      Left elbow: Normal.      Right forearm: Normal.      Left forearm: Normal.      Right wrist: Normal.      Left wrist: Normal.      Cervical back: Normal and normal range of motion. No rigidity or tenderness. Skin:     General: Skin is warm and dry. Capillary Refill: Capillary refill takes less than 2 seconds. Findings: No bruising. Neurological:      General: No focal deficit present. Mental Status: She is alert and oriented to person, place, and time. LEFT SHOULDER / UPPER EXTREMITY EXAM      OBSERVATION:      Swelling: None  deformity: none    Discoloration: none   Scapular winging: none    Scars: none    Atrophy: none      TENDERNESS / CREPITUS (T/C):      Clavicle -/-    SUPRAspinatus  -/-     AC Jt. -/-    INFRAspinatus -/-     SC Jt. -/-    Deltoid -/-     G.  Tuberosity + improved/-       LH BICEP groove + improved/-    Acromion: -/-    Midline Neck -/-     Scapular Spine -/-   Trapezium -/-    SMA Scapula -/-   GH jt. line - post -/-     Scapulothoracic -/-   GH jt. line - ant + improvement/-         ROM: (* = with pain)  Left shoulder   Right shoulder     AROM (PROM)  AROM (PROM)    FE   170° (175°)   170° (175°)     ER 0°   60° (65°)   60° (65°)     ER 90° ABD  90° (90°)   90°  (90°)    IR 90° ABD  40(40°)   40  (40°)     IR (spine) T12   T10      STRENGTH: (* = with pain) Left shoulder   Right shoulder    SCAPTION   5/5    5/5     IR    5/5    5/5     ER    5/5    5/5     BICEPS   5/5    5/5     Deltoid   5/5    5/5       SIGNS:  LUE     NEER: Positive but improved      OPRASADS: Positive but improved     SAHU: Positive  SPEEDS: Positive    DROP ARM: neg  BELLY PRESS: neg    LIFT-OFF: neg      X-Body ADD: Positive      CRANK: neg   Bear Hug: neg    Biceps stretch test: Positive but improved      EXTREMITY NEURO-VASCULAR EXAM:     Sensation grossly intact to light touch all dermatomal regions. DTR 2+ Biceps, Triceps, BR and Negative Cindis sign    Grossly intact motor function at Elbow, Wrist and Hand    Distal pulses radial and ulnar 2+, brisk cap refill, symmetric. NECK:  Painless FROM and spinous processes non-tender. Negative Spurlings sign. Diagnostic testing:  No new orthopedic imaging    Previous imaging:  X-ray images were reviewed by myself and discussed with the patient:  2 view of the left humerus in a skeletally mature patient shows no acute osseous abnormalities. Alignment is appropriate. No issues related to the glenohumeral or elbow joint. No sign of any soft tissue avulsion type injury. No osseous lesions. Soft tissue swelling seen at midshaft of humerus. Office Procedures:  No orders of the defined types were placed in this encounter. Left subacromial Shoulder Injection Procedure Note   Pre-operative Diagnosis: Left rotator cuff tendinitis   Post-operative Diagnosis: same   Indications: Symptomatic relief of tendinitis   Anesthesia: Lidocaine 1% and marcaine 0.5% without epinephrine without added sodium bicarbonate   Procedure Details   Verbal consent was obtained for the procedure. The shoulder was prepped with alcohol. A 22 gauge needle was advanced into the subacromial space through posterior approach without difficulty The space was then injected with 1 ml 1% lidocaine and 1 ml 0.5% marcaine and 1 ml of triamcinolone (KENALOG) 40mg/ml. The injection site was cleansed with isopropyl alcohol and a dressing was applied. Complications: None; patient tolerated the procedure well. Symptoms were improved immediately after the injection.     Assessment and Plan    A: Left shoulder rotator cuff tendinitis, biceps tendinitis    P:   I had a thorough conversation with the patient regarding her treatment course including injections today versus advanced imaging. I explained that because we have been doing injections for prolonged period time it is worth discussing possibly transitioning to more aggressive treatment including a MRI to discuss surgery. However, patient is improving rather than plateauing or getting worse and therefore I am okay with continuing the injections but this is just a conversation I feel is appropriate to have as the prolonged course of cortisone can thin out the rotator cuff tissues. I explained that I am not concerned about this that at this time based off her physical exam findings but again if the conversation I feel was appropriate and patient voiced understanding. She would like to hold off on any type of MRI as she is not planning a type of surgery anytime soon and will have to pay out-of-pocket and cannot afford to do so. I agreed and therefore we proceeded with left shoulder subacromial injection once again and patient did have immediate significant pain relief. She will continue with home exercises that she has been doing. She will continue with over-the-counter pain medication and ice. She will follow-up in 3 months for reevaluation and again we will discuss MRI if warranted versus cortisone injection. She will also let me know if she is in any way plateauing or worsening in regards to left shoulder function. All questions were answered and patient voices her understanding.     Electronically signed by Vickie Vieira DO on 2/10/2023 at 8:32 AM

## 2024-03-01 ENCOUNTER — TELEPHONE (OUTPATIENT)
Dept: INTERNAL MEDICINE CLINIC | Age: 64
End: 2024-03-01

## 2024-03-01 NOTE — TELEPHONE ENCOUNTER
----- Message from Karen Galvez sent at 3/1/2024 11:09 AM EST -----  Subject: Appointment Request    Reason for Call: New Patient/New to Provider Appointment needed: New   Patient Request Appointment    QUESTIONS    Reason for appointment request? Requested Provider unavailable - Tatiana Saavedra     Additional Information for Provider? Pt was seen by Dr. Saavedra years ago   and would like to est care w/her again. Please call to schedule. If Dr. Saavedra has another location, pt would be willing to go there.   ---------------------------------------------------------------------------  --------------  CALL BACK INFO  [phone]; OK to leave message on voicemail  ---------------------------------------------------------------------------  --------------  SCRIPT ANSWERS

## 2024-03-25 ENCOUNTER — OFFICE VISIT (OUTPATIENT)
Dept: INTERNAL MEDICINE CLINIC | Age: 64
End: 2024-03-25
Payer: COMMERCIAL

## 2024-03-25 VITALS
DIASTOLIC BLOOD PRESSURE: 76 MMHG | HEART RATE: 65 BPM | HEIGHT: 63 IN | BODY MASS INDEX: 31.01 KG/M2 | SYSTOLIC BLOOD PRESSURE: 126 MMHG | OXYGEN SATURATION: 96 % | WEIGHT: 175 LBS

## 2024-03-25 DIAGNOSIS — E03.9 ACQUIRED HYPOTHYROIDISM: ICD-10-CM

## 2024-03-25 DIAGNOSIS — F41.9 ANXIETY: ICD-10-CM

## 2024-03-25 DIAGNOSIS — K90.0 TRANSIENT GLUTEN SENSITIVITY: ICD-10-CM

## 2024-03-25 DIAGNOSIS — M79.675 GREAT TOE PAIN, LEFT: ICD-10-CM

## 2024-03-25 DIAGNOSIS — L30.9 HAND DERMATITIS: ICD-10-CM

## 2024-03-25 DIAGNOSIS — R53.83 FATIGUE, UNSPECIFIED TYPE: ICD-10-CM

## 2024-03-25 DIAGNOSIS — Z12.31 SCREENING MAMMOGRAM FOR BREAST CANCER: ICD-10-CM

## 2024-03-25 DIAGNOSIS — E78.5 HYPERLIPIDEMIA, UNSPECIFIED HYPERLIPIDEMIA TYPE: ICD-10-CM

## 2024-03-25 DIAGNOSIS — J45.909 MODERATE ASTHMA WITHOUT COMPLICATION, UNSPECIFIED WHETHER PERSISTENT: Primary | ICD-10-CM

## 2024-03-25 DIAGNOSIS — M41.9 SCOLIOSIS, UNSPECIFIED SCOLIOSIS TYPE, UNSPECIFIED SPINAL REGION: ICD-10-CM

## 2024-03-25 DIAGNOSIS — I10 PRIMARY HYPERTENSION: ICD-10-CM

## 2024-03-25 DIAGNOSIS — M19.91 PRIMARY OSTEOARTHRITIS, UNSPECIFIED SITE: ICD-10-CM

## 2024-03-25 PROCEDURE — 3074F SYST BP LT 130 MM HG: CPT | Performed by: FAMILY MEDICINE

## 2024-03-25 PROCEDURE — 99215 OFFICE O/P EST HI 40 MIN: CPT | Performed by: FAMILY MEDICINE

## 2024-03-25 PROCEDURE — 3078F DIAST BP <80 MM HG: CPT | Performed by: FAMILY MEDICINE

## 2024-03-25 RX ORDER — ALBUTEROL SULFATE 90 UG/1
2 AEROSOL, METERED RESPIRATORY (INHALATION) EVERY 6 HOURS PRN
Qty: 1 EACH | Refills: 5 | Status: SHIPPED | OUTPATIENT
Start: 2024-03-25

## 2024-03-25 RX ORDER — MINOCYCLINE HYDROCHLORIDE 100 MG/1
100 CAPSULE ORAL DAILY
Qty: 30 CAPSULE | Refills: 0 | Status: SHIPPED | OUTPATIENT
Start: 2024-03-25

## 2024-03-25 RX ORDER — VALACYCLOVIR HYDROCHLORIDE 500 MG/1
500 TABLET, FILM COATED ORAL 2 TIMES DAILY
Qty: 30 TABLET | Refills: 0 | Status: SHIPPED | OUTPATIENT
Start: 2024-03-25

## 2024-03-25 RX ORDER — HYDROCHLOROTHIAZIDE 25 MG/1
25 TABLET ORAL DAILY
Qty: 30 TABLET | Refills: 4 | Status: SHIPPED | OUTPATIENT
Start: 2024-03-25

## 2024-03-25 RX ORDER — LEVOTHYROXINE SODIUM 0.07 MG/1
75 TABLET ORAL DAILY
Qty: 30 TABLET | Refills: 4 | Status: SHIPPED | OUTPATIENT
Start: 2024-03-25

## 2024-03-25 RX ORDER — ATENOLOL 25 MG/1
25 TABLET ORAL DAILY
Qty: 30 TABLET | Refills: 4 | Status: SHIPPED | OUTPATIENT
Start: 2024-03-25

## 2024-03-25 RX ORDER — MELOXICAM 15 MG/1
TABLET ORAL
Qty: 30 TABLET | Refills: 4 | Status: SHIPPED | OUTPATIENT
Start: 2024-03-25

## 2024-03-25 RX ORDER — FUROSEMIDE 20 MG/1
20 TABLET ORAL DAILY
Qty: 30 TABLET | Refills: 1 | Status: CANCELLED | OUTPATIENT
Start: 2024-03-25

## 2024-03-25 RX ORDER — FLUOXETINE HYDROCHLORIDE 20 MG/1
20 CAPSULE ORAL DAILY
Qty: 30 CAPSULE | Refills: 4 | Status: SHIPPED | OUTPATIENT
Start: 2024-03-25

## 2024-03-25 RX ORDER — TIZANIDINE 4 MG/1
TABLET ORAL
Qty: 30 TABLET | Refills: 4 | Status: SHIPPED | OUTPATIENT
Start: 2024-03-25

## 2024-03-25 SDOH — ECONOMIC STABILITY: FOOD INSECURITY: WITHIN THE PAST 12 MONTHS, THE FOOD YOU BOUGHT JUST DIDN'T LAST AND YOU DIDN'T HAVE MONEY TO GET MORE.: NEVER TRUE

## 2024-03-25 SDOH — ECONOMIC STABILITY: FOOD INSECURITY: WITHIN THE PAST 12 MONTHS, YOU WORRIED THAT YOUR FOOD WOULD RUN OUT BEFORE YOU GOT MONEY TO BUY MORE.: NEVER TRUE

## 2024-03-25 SDOH — ECONOMIC STABILITY: HOUSING INSECURITY
IN THE LAST 12 MONTHS, WAS THERE A TIME WHEN YOU DID NOT HAVE A STEADY PLACE TO SLEEP OR SLEPT IN A SHELTER (INCLUDING NOW)?: NO

## 2024-03-25 SDOH — ECONOMIC STABILITY: INCOME INSECURITY: HOW HARD IS IT FOR YOU TO PAY FOR THE VERY BASICS LIKE FOOD, HOUSING, MEDICAL CARE, AND HEATING?: NOT HARD AT ALL

## 2024-03-25 ASSESSMENT — PATIENT HEALTH QUESTIONNAIRE - PHQ9
SUM OF ALL RESPONSES TO PHQ9 QUESTIONS 1 & 2: 0
SUM OF ALL RESPONSES TO PHQ QUESTIONS 1-9: 0
SUM OF ALL RESPONSES TO PHQ QUESTIONS 1-9: 0
2. FEELING DOWN, DEPRESSED OR HOPELESS: NOT AT ALL
SUM OF ALL RESPONSES TO PHQ QUESTIONS 1-9: 0
SUM OF ALL RESPONSES TO PHQ QUESTIONS 1-9: 0
1. LITTLE INTEREST OR PLEASURE IN DOING THINGS: NOT AT ALL

## 2024-03-25 ASSESSMENT — ENCOUNTER SYMPTOMS
BLOOD IN STOOL: 0
DIARRHEA: 0
CONSTIPATION: 0
BACK PAIN: 1
NAUSEA: 0
COUGH: 0
SHORTNESS OF BREATH: 0
ABDOMINAL PAIN: 0

## 2024-03-25 NOTE — PROGRESS NOTES
Praveena Rosales (:  1960) is a 64 y.o. female,New Patient, here for evaluation of the following chief complaint(s):  New Patient, Asthma, Hypothyroidism, and Hypertension         ASSESSMENT/PLAN:  1. Moderate asthma without complication, unspecified whether persistent  - albuterol sulfate HFA (VENTOLIN HFA) 108 (90 Base) MCG/ACT inhaler; Inhale 2 puffs into the lungs every 6 hours as needed for Wheezing  Dispense: 1 each; Refill: 5  She stopped Symbicort    2. Acquired hypothyroidism  - levothyroxine (SYNTHROID) 75 MCG tablet; Take 1 tablet by mouth Daily  Dispense: 30 tablet; Refill: 4  - T4, Free; Future  - TSH; Future    3. Primary hypertension  - atenolol (TENORMIN) 25 MG tablet; Take 1 tablet by mouth daily  Dispense: 30 tablet; Refill: 4  - hydroCHLOROthiazide (HYDRODIURIL) 25 MG tablet; Take 1 tablet by mouth daily  Dispense: 30 tablet; Refill: 4  - Comprehensive Metabolic Panel; Future  - CBC with Auto Differential; Future    4. Anxiety  - FLUoxetine (PROZAC) 20 MG capsule; Take 1 capsule by mouth daily  Dispense: 30 capsule; Refill: 4    5. Primary osteoarthritis, unspecified site  - meloxicam (MOBIC) 15 MG tablet; TAKE ONE TABLET BY MOUTH DAILY  Dispense: 30 tablet; Refill: 4    6. Scoliosis, unspecified scoliosis type, unspecified spinal region  - tiZANidine (ZANAFLEX) 4 MG tablet; TAKE ONE TABLET BY MOUTH AT NIGHT AS NEEDED FOR BACK PAIN  Dispense: 30 tablet; Refill: 4    7. Hyperlipidemia, unspecified hyperlipidemia type  - Lipid Panel; Future    8. Hand dermatitis, chronic-recurrent  - minocycline (MINOCIN;DYNACIN) 100 MG capsule; Take 1 capsule by mouth daily  Dispense: 30 capsule; Refill: 0    9. Transient gluten sensitivity  - Celiac Disease Panel ([facility] Only); Future    10. Great toe pain, left  - Sedimentation Rate; Future  - XR TOE LEFT (MIN 2 VIEWS); Future    11. Screening mammogram for breast cancer  - [facility] MICHAEL DIGITAL SCREEN BILATERAL PER PROTOCOL; Future    Medications

## 2024-05-13 ENCOUNTER — HOSPITAL ENCOUNTER (OUTPATIENT)
Age: 64
Discharge: HOME | End: 2024-05-13
Payer: COMMERCIAL

## 2024-05-13 ENCOUNTER — HOSPITAL ENCOUNTER (OUTPATIENT)
Dept: WOMENS IMAGING | Age: 64
Discharge: HOME | End: 2024-05-13
Payer: COMMERCIAL

## 2024-05-13 ENCOUNTER — HOSPITAL ENCOUNTER (OUTPATIENT)
Dept: GENERAL RADIOLOGY | Age: 64
Discharge: HOME | End: 2024-05-13
Payer: COMMERCIAL

## 2024-05-13 VITALS — BODY MASS INDEX: 30.12 KG/M2 | HEIGHT: 63 IN | WEIGHT: 170 LBS

## 2024-05-13 DIAGNOSIS — E78.5 HYPERLIPIDEMIA, UNSPECIFIED HYPERLIPIDEMIA TYPE: ICD-10-CM

## 2024-05-13 DIAGNOSIS — M79.675 GREAT TOE PAIN, LEFT: ICD-10-CM

## 2024-05-13 DIAGNOSIS — K90.0 TRANSIENT GLUTEN SENSITIVITY: ICD-10-CM

## 2024-05-13 DIAGNOSIS — Z12.31 SCREENING MAMMOGRAM FOR BREAST CANCER: ICD-10-CM

## 2024-05-13 DIAGNOSIS — I10 PRIMARY HYPERTENSION: ICD-10-CM

## 2024-05-13 DIAGNOSIS — E03.9 ACQUIRED HYPOTHYROIDISM: ICD-10-CM

## 2024-05-13 LAB
ALBUMIN SERPL-MCNC: 4.9 GM/DL (ref 3.4–5)
ALP BLD-CCNC: 102 IU/L (ref 40–128)
ALT SERPL-CCNC: 24 U/L (ref 10–40)
ANION GAP SERPL CALCULATED.3IONS-SCNC: 13 MMOL/L (ref 7–16)
AST SERPL-CCNC: 33 IU/L (ref 15–37)
BASOPHILS ABSOLUTE: 0 K/CU MM
BASOPHILS RELATIVE PERCENT: 0.7 % (ref 0–1)
BILIRUB SERPL-MCNC: 0.4 MG/DL (ref 0–1)
BUN SERPL-MCNC: 19 MG/DL (ref 6–23)
CALCIUM SERPL-MCNC: 9.5 MG/DL (ref 8.3–10.6)
CHLORIDE BLD-SCNC: 97 MMOL/L (ref 99–110)
CHOLEST SERPL-MCNC: 302 MG/DL
CO2: 28 MMOL/L (ref 21–32)
CREAT SERPL-MCNC: 0.8 MG/DL (ref 0.6–1.1)
DIFFERENTIAL TYPE: ABNORMAL
EOSINOPHILS ABSOLUTE: 0.2 K/CU MM
EOSINOPHILS RELATIVE PERCENT: 4.1 % (ref 0–3)
ERYTHROCYTE SEDIMENTATION RATE: 1 MM/HR (ref 0–30)
GFR, ESTIMATED: 83 ML/MIN/1.73M2
GLUCOSE SERPL-MCNC: 98 MG/DL (ref 70–99)
HCT VFR BLD CALC: 42.2 % (ref 37–47)
HDLC SERPL-MCNC: 61 MG/DL
HEMOGLOBIN: 13.4 GM/DL (ref 12.5–16)
IMMATURE NEUTROPHIL %: 0.2 % (ref 0–0.43)
LDLC SERPL CALC-MCNC: 201 MG/DL
LYMPHOCYTES ABSOLUTE: 1.8 K/CU MM
LYMPHOCYTES RELATIVE PERCENT: 30.1 % (ref 24–44)
MCH RBC QN AUTO: 28 PG (ref 27–31)
MCHC RBC AUTO-ENTMCNC: 31.8 % (ref 32–36)
MCV RBC AUTO: 88.1 FL (ref 78–100)
MONOCYTES ABSOLUTE: 0.5 K/CU MM
MONOCYTES RELATIVE PERCENT: 8.6 % (ref 0–4)
NEUTROPHILS ABSOLUTE: 3.3 K/CU MM
NEUTROPHILS RELATIVE PERCENT: 56.3 % (ref 36–66)
NUCLEATED RBC %: 0 %
PDW BLD-RTO: 13.5 % (ref 11.7–14.9)
PLATELET # BLD: 383 K/CU MM (ref 140–440)
PMV BLD AUTO: 9.6 FL (ref 7.5–11.1)
POTASSIUM SERPL-SCNC: 4.2 MMOL/L (ref 3.5–5.1)
RBC # BLD: 4.79 M/CU MM (ref 4.2–5.4)
SODIUM BLD-SCNC: 138 MMOL/L (ref 135–145)
T4 FREE SERPL-MCNC: 1.11 NG/DL (ref 0.9–1.8)
TOTAL IMMATURE NEUTOROPHIL: 0.01 K/CU MM
TOTAL NUCLEATED RBC: 0 K/CU MM
TOTAL PROTEIN: 7.2 GM/DL (ref 6.4–8.2)
TRIGL SERPL-MCNC: 202 MG/DL
TSH SERPL DL<=0.005 MIU/L-ACNC: 6.65 UIU/ML (ref 0.27–4.2)
WBC # BLD: 5.8 K/CU MM (ref 4–10.5)

## 2024-05-13 PROCEDURE — 77063 BREAST TOMOSYNTHESIS BI: CPT

## 2024-05-13 PROCEDURE — 81383 HLA II TYPING 1 ALLELE HR: CPT

## 2024-05-13 PROCEDURE — 36415 COLL VENOUS BLD VENIPUNCTURE: CPT

## 2024-05-13 PROCEDURE — 84439 ASSAY OF FREE THYROXINE: CPT

## 2024-05-13 PROCEDURE — 80061 LIPID PANEL: CPT

## 2024-05-13 PROCEDURE — 85025 COMPLETE CBC W/AUTO DIFF WBC: CPT

## 2024-05-13 PROCEDURE — 85652 RBC SED RATE AUTOMATED: CPT

## 2024-05-13 PROCEDURE — 80053 COMPREHEN METABOLIC PANEL: CPT

## 2024-05-13 PROCEDURE — 73660 X-RAY EXAM OF TOE(S): CPT

## 2024-05-13 PROCEDURE — 81376 HLA II TYPING 1 LOCUS LR: CPT

## 2024-05-13 PROCEDURE — 84443 ASSAY THYROID STIM HORMONE: CPT

## 2024-05-16 ENCOUNTER — TELEPHONE (OUTPATIENT)
Dept: INTERNAL MEDICINE CLINIC | Age: 64
End: 2024-05-16

## 2024-05-16 DIAGNOSIS — E03.9 ACQUIRED HYPOTHYROIDISM: Primary | ICD-10-CM

## 2024-05-16 DIAGNOSIS — K90.0 TRANSIENT GLUTEN SENSITIVITY: ICD-10-CM

## 2024-05-16 DIAGNOSIS — Z13.1 SCREENING FOR DIABETES MELLITUS: ICD-10-CM

## 2024-05-16 DIAGNOSIS — M25.50 ARTHRALGIA, UNSPECIFIED JOINT: ICD-10-CM

## 2024-05-16 RX ORDER — LEVOTHYROXINE SODIUM 88 UG/1
88 TABLET ORAL DAILY
Qty: 30 TABLET | Refills: 2 | Status: SHIPPED | OUTPATIENT
Start: 2024-05-16 | End: 2024-08-14

## 2024-05-17 ENCOUNTER — HOSPITAL ENCOUNTER (OUTPATIENT)
Age: 64
Discharge: HOME | End: 2024-05-17
Payer: COMMERCIAL

## 2024-05-17 ENCOUNTER — HOSPITAL ENCOUNTER (OUTPATIENT)
Dept: WOMENS IMAGING | Age: 64
Discharge: HOME | End: 2024-05-17
Payer: COMMERCIAL

## 2024-05-17 ENCOUNTER — TELEPHONE (OUTPATIENT)
Dept: INTERNAL MEDICINE CLINIC | Age: 64
End: 2024-05-17

## 2024-05-17 ENCOUNTER — HOSPITAL ENCOUNTER (OUTPATIENT)
Dept: ULTRASOUND IMAGING | Age: 64
Discharge: HOME | End: 2024-05-17
Payer: COMMERCIAL

## 2024-05-17 DIAGNOSIS — R92.8 ABNORMAL MAMMOGRAM: ICD-10-CM

## 2024-05-17 DIAGNOSIS — M25.50 ARTHRALGIA, UNSPECIFIED JOINT: ICD-10-CM

## 2024-05-17 DIAGNOSIS — R92.8 ABNORMAL MAMMOGRAM OF LEFT BREAST: ICD-10-CM

## 2024-05-17 DIAGNOSIS — E78.5 HYPERLIPIDEMIA, UNSPECIFIED HYPERLIPIDEMIA TYPE: Primary | ICD-10-CM

## 2024-05-17 DIAGNOSIS — K90.0 TRANSIENT GLUTEN SENSITIVITY: ICD-10-CM

## 2024-05-17 DIAGNOSIS — Z13.1 SCREENING FOR DIABETES MELLITUS: ICD-10-CM

## 2024-05-17 LAB — Lab: NORMAL

## 2024-05-17 PROCEDURE — 86038 ANTINUCLEAR ANTIBODIES: CPT

## 2024-05-17 PROCEDURE — 86430 RHEUMATOID FACTOR TEST QUAL: CPT

## 2024-05-17 PROCEDURE — 86140 C-REACTIVE PROTEIN: CPT

## 2024-05-17 PROCEDURE — 84550 ASSAY OF BLOOD/URIC ACID: CPT

## 2024-05-17 PROCEDURE — 86200 CCP ANTIBODY: CPT

## 2024-05-17 PROCEDURE — 76642 ULTRASOUND BREAST LIMITED: CPT

## 2024-05-17 PROCEDURE — 81376 HLA II TYPING 1 LOCUS LR: CPT

## 2024-05-17 PROCEDURE — G0279 TOMOSYNTHESIS, MAMMO: HCPCS

## 2024-05-17 PROCEDURE — 81383 HLA II TYPING 1 ALLELE HR: CPT

## 2024-05-17 PROCEDURE — 36415 COLL VENOUS BLD VENIPUNCTURE: CPT

## 2024-05-17 NOTE — TELEPHONE ENCOUNTER
Patient called back, confirmed that [facility] does do celiac panel and will be going there today for it.

## 2024-05-17 NOTE — TELEPHONE ENCOUNTER
Discussed labs and Xray. She would like to get addl labs as discussed and she would like another order for the celiac panel as this was not done. Levothyroxine was increased to 88 mcg and sent in to pharmacy. She has also restarted the Lipitor but not sure of the dose as she is not at home.She is scheduled for follow up breast imaging on 5/7

## 2024-05-17 NOTE — TELEPHONE ENCOUNTER
Fax Celiac panel to [facility] (I am not sure which [facility] she is going to). The lab order for Celiac disease panel is already on the chart, but it states ([facility] [place] only)- you may have to take this wording off. Also she did not tell me what dose of Lipitor she is on from the previous doctor. She could not remember  it yesterday and she was not at home. It also looks like they did not do the Hba1c  either?

## 2024-05-17 NOTE — TELEPHONE ENCOUNTER
Patient called the office regarding celiac panel and where to have it done. [facility] [address] does not do it there. Spoke with MA advised her to call [facility] and see if they are able to do it.     Also asked about refilling Atorvastatin. Has enough for right now and discussed with PCP taking it over and wanted to make sure she will be able to refill it when the time comes.

## 2024-05-19 LAB
CYCLIC CITRULLINE AB IGG/IGA: 3 UNITS (ref 0–19)
NUCLEAR IGG SER QL IA: NORMAL
RHEUMATOID FACTOR: 11 IU/ML (ref 0–14)

## 2024-05-20 PROBLEM — E78.5 HYPERLIPIDEMIA: Status: ACTIVE | Noted: 2024-05-20

## 2024-05-20 LAB
ENDOMYSIAL IGA ANTIBODY: NEGATIVE
GLIADIN PEPTIDE IGA: <20
GLIADIN PEPTIDE IGG: <20
IMMUNOGLOBULIN A, SERUM: 96 MG/DL (ref 81–463)
TRANSGLUTAMINASE IGA: <4

## 2024-05-20 RX ORDER — ATORVASTATIN CALCIUM 10 MG/1
10 TABLET, FILM COATED ORAL DAILY
Qty: 90 TABLET | Refills: 0 | Status: SHIPPED | OUTPATIENT
Start: 2024-05-20

## 2024-05-20 NOTE — TELEPHONE ENCOUNTER
I called and talked to Pt she did do the panel on friday but results can take a week to get. They documented that A1C was refused but she did not refuse and she will be getting that done soon. She did look at Lipitor medication and she is taking 10mg 1 tab daily please send to [facility]

## 2024-05-21 ENCOUNTER — PATIENT MESSAGE (OUTPATIENT)
Dept: INTERNAL MEDICINE CLINIC | Age: 64
End: 2024-05-21

## 2024-05-21 ENCOUNTER — HOSPITAL ENCOUNTER (OUTPATIENT)
Age: 64
Setting detail: SPECIMEN
Discharge: HOME | End: 2024-05-21
Payer: COMMERCIAL

## 2024-05-21 LAB
CRP SERPL HS-MCNC: 3 MG/L
ESTIMATED AVERAGE GLUCOSE: 120 MG/DL
HBA1C MFR BLD: 5.8 % (ref 4.2–6.3)
URATE SERPL-MCNC: 5.6 MG/DL (ref 2.6–6)

## 2024-05-21 PROCEDURE — 84550 ASSAY OF BLOOD/URIC ACID: CPT

## 2024-05-21 PROCEDURE — 86140 C-REACTIVE PROTEIN: CPT

## 2024-05-21 PROCEDURE — 36415 COLL VENOUS BLD VENIPUNCTURE: CPT

## 2024-05-21 PROCEDURE — 83036 HEMOGLOBIN GLYCOSYLATED A1C: CPT

## 2024-05-23 ENCOUNTER — TELEPHONE (OUTPATIENT)
Dept: INTERNAL MEDICINE CLINIC | Age: 64
End: 2024-05-23

## 2024-05-23 DIAGNOSIS — E03.9 ACQUIRED HYPOTHYROIDISM: ICD-10-CM

## 2024-05-23 DIAGNOSIS — E78.5 HYPERLIPIDEMIA, UNSPECIFIED HYPERLIPIDEMIA TYPE: Primary | ICD-10-CM

## 2024-05-23 DIAGNOSIS — Z79.899 LONG TERM USE OF DRUG: ICD-10-CM

## 2024-05-23 NOTE — TELEPHONE ENCOUNTER
Call to move appointment up to the end of July or early August  Spoke to patient and discussed labs. She will increase the Lipitor to 20 mg. We will move the follow up appointment up.  She will obtain the labs prior to the next appointment. Will check for a rheumatologist in [facility]

## 2024-05-30 ENCOUNTER — TELEPHONE (OUTPATIENT)
Dept: INTERNAL MEDICINE CLINIC | Age: 64
End: 2024-05-30

## 2024-05-30 DIAGNOSIS — E66.09 CLASS 1 OBESITY DUE TO EXCESS CALORIES WITHOUT SERIOUS COMORBIDITY WITH BODY MASS INDEX (BMI) OF 30.0 TO 30.9 IN ADULT: Primary | ICD-10-CM

## 2024-05-30 DIAGNOSIS — M25.50 ARTHRALGIA, UNSPECIFIED JOINT: ICD-10-CM

## 2024-06-18 ENCOUNTER — HOSPITAL ENCOUNTER (OUTPATIENT)
Dept: DIABETES SERVICES | Age: 64
Setting detail: THERAPIES SERIES
Discharge: HOME | End: 2024-06-18
Payer: COMMERCIAL

## 2024-06-18 VITALS — BODY MASS INDEX: 30.11 KG/M2 | HEIGHT: 63 IN

## 2024-06-18 PROCEDURE — 97802 MEDICAL NUTRITION INDIV IN: CPT

## 2024-06-18 NOTE — PROGRESS NOTES
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 Most of her stress related to alcoholic, bipolar son whom she cares for.     Food/nutrition habits: She eats twice per day. Consumes brunch at 10/11 am and dinner around 5/6 pm after second job. She tends to snack on junk food when she doesn't prepare meals lately. Snacks on Cheetos chips. Often eats fast food or goes out to eat due to not meal planning. Monitors sugar levels. Avoids gluten. Has followed gluten free diet in the past for over 3-4 years, but recently stopped follow. She plans to restart being gluten free and reducing dairy products in diet due to signs of inflammation.     Biochemical data: , Chol 302, , TSH, high 6.650, T4 1.11    Anthropometrics:    Ht:  5'3\"                       Wt: 170 lb                    IBW:   115 lb          % of IBW: 147.8%          BMI: 30.1    Weight hx: Reports loss up to 15+ lb since dietary changes and increased medication for hypothyroidism     Diet hx: Gluten free diet     Estimated needs:   Calorie needs: 9502-9729 kcals per day, based on [place], Activity level of 1.2-1.5  Protein needs: 62-77 grams protein per day, based on 0.8-1 gm/kg   Fluid needs: 1600-200 ml fluids per day, based on 1 ml/kcal, depending on activity level    Nutrition dx: Limited

## 2024-07-01 DIAGNOSIS — E78.5 HYPERLIPIDEMIA, UNSPECIFIED HYPERLIPIDEMIA TYPE: ICD-10-CM

## 2024-07-01 RX ORDER — ATORVASTATIN CALCIUM 10 MG/1
10 TABLET, FILM COATED ORAL DAILY
Qty: 90 TABLET | Refills: 0 | Status: CANCELLED | OUTPATIENT
Start: 2024-07-01

## 2024-07-01 RX ORDER — ATORVASTATIN CALCIUM 20 MG/1
20 TABLET, FILM COATED ORAL DAILY
Qty: 30 TABLET | Refills: 2 | Status: SHIPPED | OUTPATIENT
Start: 2024-07-01

## 2024-07-01 NOTE — TELEPHONE ENCOUNTER
Patient needs refill but was told by PCP to call the office when she ran out so that she can increase the medication to 20 MG's

## 2024-07-05 DIAGNOSIS — L30.9 HAND DERMATITIS: ICD-10-CM

## 2024-07-08 RX ORDER — MINOCYCLINE HYDROCHLORIDE 100 MG/1
CAPSULE ORAL DAILY
Qty: 30 CAPSULE | Refills: 1 | Status: SHIPPED | OUTPATIENT
Start: 2024-07-08

## 2024-07-09 ENCOUNTER — TELEPHONE (OUTPATIENT)
Dept: INTERNAL MEDICINE CLINIC | Age: 64
End: 2024-07-09

## 2024-07-09 DIAGNOSIS — M25.50 ARTHRALGIA, UNSPECIFIED JOINT: Primary | ICD-10-CM

## 2024-07-09 NOTE — TELEPHONE ENCOUNTER
Got a call from the patient, stated she received a message about scheduling follow up appointment with pcp, got her scheduled for an appointment in August. Patient also mentioned she was referred to rheumatology in [place] a while back and had not heard from anyone yet, provided phone number for her. She also stated pcp mentioned rheumatologist in [place] but that provider is booked out, asked if referral could be placed as she does not mind waiting to be seen, prefers somewhere in [place].

## 2024-07-28 DIAGNOSIS — E03.9 ACQUIRED HYPOTHYROIDISM: ICD-10-CM

## 2024-07-29 RX ORDER — LEVOTHYROXINE SODIUM 88 UG/1
88 TABLET ORAL DAILY
Qty: 30 TABLET | Refills: 0 | Status: SHIPPED | OUTPATIENT
Start: 2024-07-29

## 2024-08-22 ENCOUNTER — HOSPITAL ENCOUNTER (OUTPATIENT)
Age: 64
Discharge: HOME | End: 2024-08-22
Payer: COMMERCIAL

## 2024-08-22 DIAGNOSIS — Z79.899 LONG TERM USE OF DRUG: ICD-10-CM

## 2024-08-22 DIAGNOSIS — E03.9 ACQUIRED HYPOTHYROIDISM: ICD-10-CM

## 2024-08-22 DIAGNOSIS — E78.5 HYPERLIPIDEMIA, UNSPECIFIED HYPERLIPIDEMIA TYPE: ICD-10-CM

## 2024-08-22 LAB
ALBUMIN SERPL-MCNC: 4.1 GM/DL (ref 3.4–5)
ALP BLD-CCNC: 93 IU/L (ref 40–129)
ALT SERPL-CCNC: 14 U/L (ref 10–40)
AST SERPL-CCNC: 16 IU/L (ref 15–37)
BILIRUB SERPL-MCNC: 0.4 MG/DL (ref 0–1)
BILIRUBIN DIRECT: 0.2 MG/DL (ref 0–0.3)
BILIRUBIN, INDIRECT: 0.2 MG/DL (ref 0–0.7)
CHOLEST SERPL-MCNC: 185 MG/DL
HDLC SERPL-MCNC: 66 MG/DL
LDLC SERPL CALC-MCNC: 86 MG/DL
T4 FREE SERPL-MCNC: 1.18 NG/DL (ref 0.9–1.8)
TOTAL PROTEIN: 6.2 GM/DL (ref 6.4–8.2)
TRIGL SERPL-MCNC: 167 MG/DL
TSH SERPL DL<=0.005 MIU/L-ACNC: 4.93 UIU/ML (ref 0.27–4.2)

## 2024-08-22 PROCEDURE — 80076 HEPATIC FUNCTION PANEL: CPT

## 2024-08-22 PROCEDURE — 84439 ASSAY OF FREE THYROXINE: CPT

## 2024-08-22 PROCEDURE — 80061 LIPID PANEL: CPT

## 2024-08-22 PROCEDURE — 36415 COLL VENOUS BLD VENIPUNCTURE: CPT

## 2024-08-22 PROCEDURE — 84443 ASSAY THYROID STIM HORMONE: CPT

## 2024-08-23 ENCOUNTER — OFFICE VISIT (OUTPATIENT)
Dept: INTERNAL MEDICINE CLINIC | Age: 64
End: 2024-08-23
Payer: COMMERCIAL

## 2024-08-23 ENCOUNTER — TELEPHONE (OUTPATIENT)
Dept: INTERNAL MEDICINE CLINIC | Age: 64
End: 2024-08-23

## 2024-08-23 VITALS
OXYGEN SATURATION: 95 % | WEIGHT: 169.4 LBS | HEART RATE: 67 BPM | BODY MASS INDEX: 30.02 KG/M2 | SYSTOLIC BLOOD PRESSURE: 116 MMHG | DIASTOLIC BLOOD PRESSURE: 80 MMHG | HEIGHT: 63 IN

## 2024-08-23 DIAGNOSIS — L30.9 HAND DERMATITIS: ICD-10-CM

## 2024-08-23 DIAGNOSIS — I10 PRIMARY HYPERTENSION: ICD-10-CM

## 2024-08-23 DIAGNOSIS — F41.9 ANXIETY: ICD-10-CM

## 2024-08-23 DIAGNOSIS — E03.9 ACQUIRED HYPOTHYROIDISM: Primary | ICD-10-CM

## 2024-08-23 DIAGNOSIS — E78.5 HYPERLIPIDEMIA, UNSPECIFIED HYPERLIPIDEMIA TYPE: ICD-10-CM

## 2024-08-23 DIAGNOSIS — M41.9 SCOLIOSIS, UNSPECIFIED SCOLIOSIS TYPE, UNSPECIFIED SPINAL REGION: ICD-10-CM

## 2024-08-23 DIAGNOSIS — M19.91 PRIMARY OSTEOARTHRITIS, UNSPECIFIED SITE: ICD-10-CM

## 2024-08-23 DIAGNOSIS — J45.41 MODERATE PERSISTENT ASTHMA WITH ACUTE EXACERBATION: ICD-10-CM

## 2024-08-23 PROCEDURE — 3074F SYST BP LT 130 MM HG: CPT | Performed by: FAMILY MEDICINE

## 2024-08-23 PROCEDURE — 3079F DIAST BP 80-89 MM HG: CPT | Performed by: FAMILY MEDICINE

## 2024-08-23 PROCEDURE — G8417 CALC BMI ABV UP PARAM F/U: HCPCS | Performed by: FAMILY MEDICINE

## 2024-08-23 PROCEDURE — 99214 OFFICE O/P EST MOD 30 MIN: CPT | Performed by: FAMILY MEDICINE

## 2024-08-23 PROCEDURE — 3017F COLORECTAL CA SCREEN DOC REV: CPT | Performed by: FAMILY MEDICINE

## 2024-08-23 PROCEDURE — 1036F TOBACCO NON-USER: CPT | Performed by: FAMILY MEDICINE

## 2024-08-23 PROCEDURE — G8427 DOCREV CUR MEDS BY ELIG CLIN: HCPCS | Performed by: FAMILY MEDICINE

## 2024-08-23 RX ORDER — MINOCYCLINE HYDROCHLORIDE 100 MG/1
100 CAPSULE ORAL DAILY
Qty: 30 CAPSULE | Refills: 4 | Status: SHIPPED | OUTPATIENT
Start: 2024-08-23

## 2024-08-23 RX ORDER — ATORVASTATIN CALCIUM 20 MG/1
20 TABLET, FILM COATED ORAL DAILY
Qty: 30 TABLET | Refills: 4 | Status: SHIPPED | OUTPATIENT
Start: 2024-08-23

## 2024-08-23 RX ORDER — HYDROCHLOROTHIAZIDE 25 MG/1
25 TABLET ORAL DAILY
Qty: 30 TABLET | Refills: 4 | Status: SHIPPED | OUTPATIENT
Start: 2024-08-23

## 2024-08-23 RX ORDER — LEVOTHYROXINE SODIUM 100 UG/1
100 TABLET ORAL DAILY
Qty: 30 TABLET | Refills: 4 | Status: SHIPPED | OUTPATIENT
Start: 2024-08-23

## 2024-08-23 RX ORDER — ATENOLOL 25 MG/1
25 TABLET ORAL DAILY
Qty: 30 TABLET | Refills: 4 | Status: SHIPPED | OUTPATIENT
Start: 2024-08-23

## 2024-08-23 RX ORDER — MELOXICAM 15 MG/1
TABLET ORAL
Qty: 30 TABLET | Refills: 4 | Status: SHIPPED | OUTPATIENT
Start: 2024-08-23

## 2024-08-23 NOTE — PROGRESS NOTES
Praveena Rosales (:  1960) is a 64 y.o. female,established patient, here for evaluation of the following chief complaint(s):  Follow-up (Pt states that she is sick and has been sick for about a week, started with headaches and head congestion and now its in her chest ), Asthma, and Hyperlipidemia      Assessment & Plan   ASSESSMENT/PLAN:  1. Acquired hypothyroidism  -Increase levothyroxine to 100 mcg  - levothyroxine (SYNTHROID) 100 MCG tablet; Take 1 tablet by mouth daily  Dispense: 30 tablet; Refill: 4  - T4, Free; Future  - TSH; Future    2. Hyperlipidemia, unspecified hyperlipidemia type  - atorvastatin (LIPITOR) 20 MG tablet; Take 1 tablet by mouth daily  Dispense: 30 tablet; Refill: 4  - Lipid Panel; Future  - Comprehensive Metabolic Panel; Future    3. Primary hypertension  -She may consider using a 1/2 tablet of atenolol  - atenolol (TENORMIN) 25 MG tablet; Take 1 tablet by mouth daily  Dispense: 30 tablet; Refill: 4  - hydroCHLOROthiazide (HYDRODIURIL) 25 MG tablet; Take 1 tablet by mouth daily  Dispense: 30 tablet; Refill: 4  - Comprehensive Metabolic Panel; Future  Monitor BP    4. Moderate persistent asthma with acute exacerbation  Continue prednisone and albuterol    5. Anxiety  - FLUoxetine (PROZAC) 20 MG capsule; Take 1 capsule by mouth daily  Dispense: 30 capsule; Refill: 4    6. Primary osteoarthritis, unspecified site  - meloxicam (MOBIC) 15 MG tablet; TAKE ONE TABLET BY MOUTH DAILY  Dispense: 30 tablet; Refill: 4  ADR's explained. Pt advised of the risk of use    7. Hand dermatitis  - minocycline (MINOCIN;DYNACIN) 100 MG capsule; Take 1 capsule by mouth daily  Dispense: 30 capsule; Refill: 4  ADR's explained. Patient aware of the side effects of minocycline. She was started on this medication by a previous physician     8. Scoliosis, unspecified scoliosis type, unspecified spinal region  - tiZANidine (ZANAFLEX) 4 MG tablet; TAKE ONE TABLET BY MOUTH AT NIGHT AS NEEDED FOR BACK PAIN   negative-- she had URI  She can not get an appointment until Jan with rheumatology--she prefers to stay in \[facility]\""    Review of Systems   Constitutional:  Negative for diaphoresis and fever.   Respiratory:  Negative for cough and shortness of breath.    Cardiovascular:  Negative for chest pain and palpitations.   Gastrointestinal:  Negative for abdominal pain and nausea.   Genitourinary:  Negative for difficulty urinating.   Neurological:  Negative for dizziness and headaches.       Allergies   Allergen Reactions    Black Pepper-Turmeric Shortness Of Breath    Aspirin      Lump behind her ear    Codeine     Fluoride Preparations     Penicillins     Piper     Sulfa Antibiotics      Current Outpatient Medications   Medication Sig Dispense Refill    levothyroxine (SYNTHROID) 100 MCG tablet Take 1 tablet by mouth daily 30 tablet 4    atenolol (TENORMIN) 25 MG tablet Take 1 tablet by mouth daily 30 tablet 4    atorvastatin (LIPITOR) 20 MG tablet Take 1 tablet by mouth daily 30 tablet 4    FLUoxetine (PROZAC) 20 MG capsule Take 1 capsule by mouth daily 30 capsule 4    meloxicam (MOBIC) 15 MG tablet TAKE ONE TABLET BY MOUTH DAILY 30 tablet 4    minocycline (MINOCIN;DYNACIN) 100 MG capsule Take 1 capsule by mouth daily 30 capsule 4    tiZANidine (ZANAFLEX) 4 MG tablet TAKE ONE TABLET BY MOUTH AT NIGHT AS NEEDED FOR BACK PAIN 30 tablet 4    hydroCHLOROthiazide (HYDRODIURIL) 25 MG tablet Take 1 tablet by mouth daily 30 tablet 4    albuterol sulfate HFA (VENTOLIN HFA) 108 (90 Base) MCG/ACT inhaler Inhale 2 puffs into the lungs every 6 hours as needed for Wheezing 1 each 5    valACYclovir (VALTREX) 500 MG tablet Take 1 tablet by mouth 2 times daily 30 tablet 0    Multiple Vitamin (MULTIVITAMIN ADULT PO) multivitamin   one daily      Cetirizine HCl 10 MG CAPS cetirizine 10 mg capsule   Take 1 capsule every day by oral route.      triamcinolone (NASACORT ALLERGY 24HR) 55 MCG/ACT nasal inhaler Nasacort 55 mcg nasal spray aerosol

## 2024-10-07 ENCOUNTER — TELEPHONE (OUTPATIENT)
Dept: INTERNAL MEDICINE CLINIC | Age: 64
End: 2024-10-07

## 2024-10-07 NOTE — TELEPHONE ENCOUNTER
Inform the patient I am not even sure if she even has a kidney stone.  She would need an appointment and a workup with a CAT scan and labs to determine the cause of her symptoms.  I suggest that she not wait until Wednesday and advise her to seek an evaluation with our [facility] walk-in clinic (you can give her the number to call for a same day appointment), or go to the emergency room. Therefore, I am not going to advise her over the phone about abdominal pain.

## 2024-10-07 NOTE — TELEPHONE ENCOUNTER
Patient called stating that she is having abdominal pain. Patient stated that she has had a kidney stone for 15 years and starting to bother her again. Patient wants recommendations on how to deal with pain until her Wednesday appt.?

## 2024-10-08 ENCOUNTER — OFFICE VISIT (OUTPATIENT)
Dept: FAMILY MEDICINE CLINIC | Age: 64
End: 2024-10-08
Payer: COMMERCIAL

## 2024-10-08 ENCOUNTER — HOSPITAL ENCOUNTER (OUTPATIENT)
Dept: CT IMAGING | Age: 64
Discharge: HOME | End: 2024-10-08
Payer: COMMERCIAL

## 2024-10-08 ENCOUNTER — HOSPITAL ENCOUNTER (OUTPATIENT)
Age: 64
Discharge: HOME | End: 2024-10-08
Payer: COMMERCIAL

## 2024-10-08 VITALS
SYSTOLIC BLOOD PRESSURE: 130 MMHG | TEMPERATURE: 97.9 F | HEART RATE: 60 BPM | OXYGEN SATURATION: 96 % | WEIGHT: 169 LBS | BODY MASS INDEX: 29.94 KG/M2 | DIASTOLIC BLOOD PRESSURE: 80 MMHG

## 2024-10-08 DIAGNOSIS — K57.92 DIVERTICULITIS: Primary | ICD-10-CM

## 2024-10-08 DIAGNOSIS — R10.32 LEFT LOWER QUADRANT ABDOMINAL PAIN: ICD-10-CM

## 2024-10-08 DIAGNOSIS — R10.32 LEFT LOWER QUADRANT ABDOMINAL PAIN: Primary | ICD-10-CM

## 2024-10-08 LAB
BASOPHILS # BLD: 0.02 K/UL
BASOPHILS NFR BLD: 0 % (ref 0–1)
BILIRUBIN, POC: NORMAL
BLOOD URINE, POC: NORMAL
CLARITY, POC: CLEAR
COLOR, POC: YELLOW
EOSINOPHIL # BLD: 0.18 K/UL
EOSINOPHILS RELATIVE PERCENT: 3 % (ref 0–3)
ERYTHROCYTE [DISTWIDTH] IN BLOOD BY AUTOMATED COUNT: 13.5 % (ref 11.7–14.9)
GLUCOSE URINE, POC: NORMAL MG/DL
HCT VFR BLD AUTO: 41.8 % (ref 37–47)
HGB BLD-MCNC: 13 G/DL (ref 12.5–16)
IMM GRANULOCYTES # BLD AUTO: 0.01 K/UL
IMM GRANULOCYTES NFR BLD: 0 %
KETONES, POC: NORMAL MG/DL
LEUKOCYTE EST, POC: NORMAL
LYMPHOCYTES NFR BLD: 1.52 K/UL
LYMPHOCYTES RELATIVE PERCENT: 23 % (ref 24–44)
MCH RBC QN AUTO: 27.1 PG (ref 27–31)
MCHC RBC AUTO-ENTMCNC: 31.1 G/DL (ref 32–36)
MCV RBC AUTO: 87.1 FL (ref 78–100)
MONOCYTES NFR BLD: 0.46 K/UL
MONOCYTES NFR BLD: 7 % (ref 0–4)
NEUTROPHILS NFR BLD: 66 % (ref 36–66)
NEUTS SEG NFR BLD: 4.34 K/UL
NITRITE, POC: NORMAL
PH, POC: 6.5
PLATELET # BLD AUTO: 302 K/UL (ref 140–440)
PMV BLD AUTO: 9.8 FL (ref 7.5–11.1)
PROTEIN, POC: NORMAL MG/DL
RBC # BLD AUTO: 4.8 M/UL (ref 4.2–5.4)
SPECIFIC GRAVITY, POC: 1.02
UROBILINOGEN, POC: NORMAL MG/DL
WBC OTHER # BLD: 6.5 K/UL (ref 4–10.5)

## 2024-10-08 PROCEDURE — 1036F TOBACCO NON-USER: CPT | Performed by: NURSE PRACTITIONER

## 2024-10-08 PROCEDURE — G8427 DOCREV CUR MEDS BY ELIG CLIN: HCPCS | Performed by: NURSE PRACTITIONER

## 2024-10-08 PROCEDURE — G8484 FLU IMMUNIZE NO ADMIN: HCPCS | Performed by: NURSE PRACTITIONER

## 2024-10-08 PROCEDURE — 99213 OFFICE O/P EST LOW 20 MIN: CPT | Performed by: NURSE PRACTITIONER

## 2024-10-08 PROCEDURE — 81002 URINALYSIS NONAUTO W/O SCOPE: CPT | Performed by: NURSE PRACTITIONER

## 2024-10-08 PROCEDURE — 74176 CT ABD & PELVIS W/O CONTRAST: CPT

## 2024-10-08 PROCEDURE — 3079F DIAST BP 80-89 MM HG: CPT | Performed by: NURSE PRACTITIONER

## 2024-10-08 PROCEDURE — 3017F COLORECTAL CA SCREEN DOC REV: CPT | Performed by: NURSE PRACTITIONER

## 2024-10-08 PROCEDURE — 3075F SYST BP GE 130 - 139MM HG: CPT | Performed by: NURSE PRACTITIONER

## 2024-10-08 PROCEDURE — 36415 COLL VENOUS BLD VENIPUNCTURE: CPT

## 2024-10-08 PROCEDURE — 85025 COMPLETE CBC W/AUTO DIFF WBC: CPT

## 2024-10-08 PROCEDURE — G8417 CALC BMI ABV UP PARAM F/U: HCPCS | Performed by: NURSE PRACTITIONER

## 2024-10-08 RX ORDER — CIPROFLOXACIN 500 MG/1
500 TABLET, FILM COATED ORAL 2 TIMES DAILY
Qty: 20 TABLET | Refills: 0 | Status: SHIPPED | OUTPATIENT
Start: 2024-10-08 | End: 2024-10-18

## 2024-10-08 RX ORDER — METRONIDAZOLE 500 MG/1
500 TABLET ORAL 3 TIMES DAILY
Qty: 30 TABLET | Refills: 0 | Status: SHIPPED | OUTPATIENT
Start: 2024-10-08 | End: 2024-10-18

## 2024-10-08 ASSESSMENT — ENCOUNTER SYMPTOMS
VOMITING: 0
DIARRHEA: 1
WHEEZING: 0
SINUS PAIN: 0
COUGH: 0
CONSTIPATION: 0
SINUS PRESSURE: 0
CHEST TIGHTNESS: 0
BELCHING: 0
ABDOMINAL PAIN: 1
SHORTNESS OF BREATH: 0
RHINORRHEA: 0
FLATUS: 0
NAUSEA: 0
SORE THROAT: 0
HEMATOCHEZIA: 0

## 2024-10-08 NOTE — PROGRESS NOTES
Praveena Rosales   64 y.o.  female  [id number]      Chief Complaint   Patient presents with    Abdominal Pain     Lower left side-started night before last per pt         Subjective:  64 y.o.female is here for a follow up. She has the following chronic/acute medical problems:  Patient Active Problem List   Diagnosis    Scoliosis    Acquired hypothyroidism    Asthma    Anxiety    Hand dermatitis    Hypertension    Strain of left biceps    Rotator cuff tendonitis, left    Hyperlipidemia       Abdominal Pain  This is a new problem. The current episode started in the past 7 days (2 days ago). The onset quality is undetermined. The problem occurs constantly. The pain is located in the LLQ. The pain is at a severity of 6/10. The quality of the pain is aching. The abdominal pain does not radiate. Associated symptoms include diarrhea (little bit). Pertinent negatives include no anorexia, arthralgias, belching, constipation, dysuria, fever, flatus, frequency, headaches, hematochezia, hematuria, melena, myalgias, nausea, vomiting or weight loss. Nothing aggravates the pain. The pain is relieved by Nothing. She has tried acetaminophen for the symptoms. The treatment provided no relief.       Review of Systems   Constitutional:  Negative for appetite change, chills, fatigue, fever and weight loss.   HENT:  Negative for congestion, ear pain, postnasal drip, rhinorrhea, sinus pressure, sinus pain, sneezing and sore throat.    Respiratory:  Negative for cough, chest tightness, shortness of breath and wheezing.    Cardiovascular:  Negative for chest pain and palpitations.   Gastrointestinal:  Positive for abdominal pain and diarrhea (little bit). Negative for anorexia, constipation, flatus, hematochezia, melena, nausea and vomiting.   Genitourinary:  Negative for dysuria, frequency and hematuria.   Musculoskeletal:  Negative for arthralgias and myalgias.   Skin:  Negative for rash.   Neurological:  Negative for dizziness,

## 2024-10-09 ENCOUNTER — OFFICE VISIT (OUTPATIENT)
Dept: INTERNAL MEDICINE CLINIC | Age: 64
End: 2024-10-09
Payer: COMMERCIAL

## 2024-10-09 VITALS
DIASTOLIC BLOOD PRESSURE: 84 MMHG | HEART RATE: 69 BPM | OXYGEN SATURATION: 98 % | BODY MASS INDEX: 30.08 KG/M2 | RESPIRATION RATE: 14 BRPM | SYSTOLIC BLOOD PRESSURE: 124 MMHG | WEIGHT: 169.8 LBS

## 2024-10-09 DIAGNOSIS — R10.9 FLANK PAIN: ICD-10-CM

## 2024-10-09 DIAGNOSIS — K57.92 DIVERTICULITIS: Primary | ICD-10-CM

## 2024-10-09 DIAGNOSIS — N20.0 KIDNEY STONE: ICD-10-CM

## 2024-10-09 PROCEDURE — G8484 FLU IMMUNIZE NO ADMIN: HCPCS | Performed by: FAMILY MEDICINE

## 2024-10-09 PROCEDURE — 99213 OFFICE O/P EST LOW 20 MIN: CPT | Performed by: FAMILY MEDICINE

## 2024-10-09 PROCEDURE — 3074F SYST BP LT 130 MM HG: CPT | Performed by: FAMILY MEDICINE

## 2024-10-09 PROCEDURE — G8417 CALC BMI ABV UP PARAM F/U: HCPCS | Performed by: FAMILY MEDICINE

## 2024-10-09 PROCEDURE — 1036F TOBACCO NON-USER: CPT | Performed by: FAMILY MEDICINE

## 2024-10-09 PROCEDURE — 3017F COLORECTAL CA SCREEN DOC REV: CPT | Performed by: FAMILY MEDICINE

## 2024-10-09 PROCEDURE — G8427 DOCREV CUR MEDS BY ELIG CLIN: HCPCS | Performed by: FAMILY MEDICINE

## 2024-10-09 PROCEDURE — 3079F DIAST BP 80-89 MM HG: CPT | Performed by: FAMILY MEDICINE

## 2024-10-09 ASSESSMENT — ENCOUNTER SYMPTOMS
SHORTNESS OF BREATH: 0
CONSTIPATION: 0
ABDOMINAL PAIN: 1
NAUSEA: 0
COUGH: 0
DIARRHEA: 1

## 2024-10-09 NOTE — PROGRESS NOTES
Praveena Rosales (:  1960) is a 64 y.o. female,established patient, here for evaluation of the following chief complaint(s):  Abnormal CT (Had CT done yesterday, would like to discuss results )      Assessment & Plan   ASSESSMENT/PLAN:  1. Diverticulitis  - [facility] [place]  Continue Cipro and Flagyl    2. Kidney stone  - External Referral To Urology    3. Flank pain  - External Referral To Urology    Medications reconciled and discussed with the patient  Note for work  Return if symptoms worsen or fail to improve.       10/8/24 CT Abdomen Pelvis WO Contrast  IMPRESSION:     1. Uncomplicated acute sigmoid diverticulitis.  2. Nonobstructive 8 mm left renal calculus.  3. Possible gallstone versus biliary sludge.      Lab Results   Component Value Date    WBC 6.5 10/08/2024    HGB 13.0 10/08/2024    HCT 41.8 10/08/2024    MCV 87.1 10/08/2024     10/08/2024     Lab Results   Component Value Date    LABA1C 5.8 2024     Lab Results   Component Value Date     2024     Lab Results   Component Value Date     2024    K 4.2 2024    CL 97 (L) 2024    CO2 28 2024    BUN 19 2024    CREATININE 0.8 2024    GLUCOSE 98 2024    CALCIUM 9.5 2024    BILITOT 0.4 2024    ALKPHOS 93 2024    AST 16 2024    ALT 14 2024    LABGLOM 83 2024    GFRAA >60 2021     Lab Results   Component Value Date    TSH 1.98 2021    TSHHS 4.930 (H) 2024       Lab Results   Component Value Date/Time    COLORU Yellow 10/08/2024 09:29 AM    GLUCOSEU Neg 10/08/2024 09:29 AM    KETUA Neg 10/08/2024 09:29 AM    BILIRUBINUR Neg 10/08/2024 09:29 AM       Subjective   SUBJECTIVE/OBJECTIVE:    HISTORY OF PRESENT ILLNESS:  This is a 64 y.o. female here for the following:  Patient Active Problem List    Diagnosis Date Noted    Hyperlipidemia 2024    Rotator cuff tendonitis, left 2021    Strain of left biceps

## 2024-10-15 ENCOUNTER — TELEPHONE (OUTPATIENT)
Dept: GASTROENTEROLOGY | Age: 64
End: 2024-10-15

## 2024-10-15 NOTE — TELEPHONE ENCOUNTER
Called pt. In regards to a referral for diverticulitis. Made appt for pt to see dr. Price on 10/23/24 @2:30pm

## 2024-10-16 ENCOUNTER — TELEPHONE (OUTPATIENT)
Dept: INTERNAL MEDICINE CLINIC | Age: 64
End: 2024-10-16

## 2024-10-16 DIAGNOSIS — N20.0 KIDNEY STONE: Primary | ICD-10-CM

## 2024-10-16 DIAGNOSIS — R10.9 FLANK PAIN: ICD-10-CM

## 2024-10-16 NOTE — TELEPHONE ENCOUNTER
Fax referral to [facility]. Inform patient about the new referral and give her the number once it has been faxed

## 2024-10-16 NOTE — TELEPHONE ENCOUNTER
Patient called stating that she was referred to an urologist in [place]. They do not take her insurance. She would like to be referred to the urologist in [place].

## 2024-10-23 ENCOUNTER — OFFICE VISIT (OUTPATIENT)
Dept: GASTROENTEROLOGY | Age: 64
End: 2024-10-23
Payer: COMMERCIAL

## 2024-10-23 VITALS
HEART RATE: 92 BPM | SYSTOLIC BLOOD PRESSURE: 142 MMHG | RESPIRATION RATE: 16 BRPM | BODY MASS INDEX: 31.22 KG/M2 | HEIGHT: 63 IN | OXYGEN SATURATION: 95 % | WEIGHT: 176.2 LBS | DIASTOLIC BLOOD PRESSURE: 80 MMHG

## 2024-10-23 DIAGNOSIS — R19.4 CHANGE IN BOWEL HABITS: ICD-10-CM

## 2024-10-23 DIAGNOSIS — K57.32 DIVERTICULITIS OF LARGE INTESTINE WITHOUT PERFORATION OR ABSCESS WITHOUT BLEEDING: Primary | ICD-10-CM

## 2024-10-23 PROCEDURE — 3077F SYST BP >= 140 MM HG: CPT | Performed by: INTERNAL MEDICINE

## 2024-10-23 PROCEDURE — 3017F COLORECTAL CA SCREEN DOC REV: CPT | Performed by: INTERNAL MEDICINE

## 2024-10-23 PROCEDURE — G8484 FLU IMMUNIZE NO ADMIN: HCPCS | Performed by: INTERNAL MEDICINE

## 2024-10-23 PROCEDURE — 99204 OFFICE O/P NEW MOD 45 MIN: CPT | Performed by: INTERNAL MEDICINE

## 2024-10-23 PROCEDURE — G8427 DOCREV CUR MEDS BY ELIG CLIN: HCPCS | Performed by: INTERNAL MEDICINE

## 2024-10-23 PROCEDURE — G8417 CALC BMI ABV UP PARAM F/U: HCPCS | Performed by: INTERNAL MEDICINE

## 2024-10-23 PROCEDURE — 3079F DIAST BP 80-89 MM HG: CPT | Performed by: INTERNAL MEDICINE

## 2024-10-23 PROCEDURE — 1036F TOBACCO NON-USER: CPT | Performed by: INTERNAL MEDICINE

## 2024-10-23 NOTE — PROGRESS NOTES
TOXIN/ANTIGEN    Gastrointestinal Panel, Molecular    COLONOSCOPY (Diagnostic)        Orders Placed This Encounter   Procedures    COLONOSCOPY (Diagnostic)     Standing Status:   Future     Standing Expiration Date:   4/23/2025     Order Specific Question:   Screening or Diagnostic?     Answer:   Diagnostic    C DIFF TOXIN/ANTIGEN     Standing Status:   Future     Standing Expiration Date:   10/23/2025    Gastrointestinal Panel, Molecular     Standing Status:   Future     Standing Expiration Date:   10/23/2025    IgA     Standing Status:   Future     Standing Expiration Date:   10/23/2025    Tissue Transglutaminase, IgA     Standing Status:   Future     Standing Expiration Date:   10/23/2025    Calprotectin Stool     Standing Status:   Future     Standing Expiration Date:   10/23/2025     #1 Acute diverticulitis   She mentions she recently had an episode of diverticulitis. She had a CT done on 10/8/24 due to diarrhea and abdominal pain. She took antibiotics cipro and flagyl with improvement of her symptoms but still has diarrhea. She mentions she has had an episode of diverticulitis 20 years ago. Last colonoscopy 40 years ago.   Plan for colonoscopy     #2 GERD   Currently mentions intermittent GERD for which she takes tagamet PRN. No dysphagia, melena or hematochezia.   Discussed with the patient that they should eat small frequent meals, discussed about making a conscious effort of eating 1/4-1/2 of his portion and then eating the rest 2 to 3 hours later.  Also instructed to sit upright and eat his meals. Also recommended weight loss and healthy habits.  Also instructed to avoid eating 3 to 4 hours prior to sleeping.    #3 Family hx of Advanced adenoma  Mom had a large colon polyp and needed to have surgery to have it removed and unfortunately passed post surgery.       No follow-ups on file.    Karol Price MD 10/23/2024 3:26 PM     Time of note may not reflect time of encounter     Patient was seen with total

## 2024-10-25 ENCOUNTER — E-VISIT (OUTPATIENT)
Dept: INTERNAL MEDICINE CLINIC | Age: 64
End: 2024-10-25
Payer: COMMERCIAL

## 2024-10-25 DIAGNOSIS — R05.1 ACUTE COUGH: ICD-10-CM

## 2024-10-25 DIAGNOSIS — Z20.828 EXPOSURE TO THE FLU: ICD-10-CM

## 2024-10-25 DIAGNOSIS — R68.89 FLU-LIKE SYMPTOMS: ICD-10-CM

## 2024-10-25 DIAGNOSIS — J45.41 MODERATE PERSISTENT ASTHMA WITH ACUTE EXACERBATION: Primary | ICD-10-CM

## 2024-10-25 PROCEDURE — 99422 OL DIG E/M SVC 11-20 MIN: CPT | Performed by: FAMILY MEDICINE

## 2024-10-25 RX ORDER — BROMPHENIRAMINE MALEATE, PSEUDOEPHEDRINE HYDROCHLORIDE, AND DEXTROMETHORPHAN HYDROBROMIDE 2; 30; 10 MG/5ML; MG/5ML; MG/5ML
10 SYRUP ORAL 3 TIMES DAILY PRN
Qty: 150 ML | Refills: 0 | Status: SHIPPED | OUTPATIENT
Start: 2024-10-25

## 2024-10-25 RX ORDER — MONTELUKAST SODIUM 10 MG/1
10 TABLET ORAL DAILY
Qty: 30 TABLET | Refills: 3 | Status: SHIPPED | OUTPATIENT
Start: 2024-10-25

## 2024-10-25 RX ORDER — OSELTAMIVIR PHOSPHATE 75 MG/1
75 CAPSULE ORAL 2 TIMES DAILY
Qty: 10 CAPSULE | Refills: 0 | Status: SHIPPED | OUTPATIENT
Start: 2024-10-25 | End: 2024-10-30

## 2024-10-25 ASSESSMENT — LIFESTYLE VARIABLES: SMOKING_STATUS: NO, I HAVE NEVER SMOKED

## 2024-10-25 NOTE — PROGRESS NOTES
HPI: This 65 yo F here for the following: cough with scant phlegm, wheezing, flulike symptoms, exposure to the flu, rhinorrhea.  Patient has known asthma and she feels this has flared.  She has prednisone at home and she will start to use.  She is also been using her albuterol inhaler, guaifenesin, cough drops, antihistamines.  Patient states she had success with Bromfed for her cough  in the past, and would like to use.  We discussed medications and in depth for her other symptoms.She would like to start Tamiflu and Singulair. She is unable to tolerate  Symbicort or similar medications. Managed by pulmonology in  the past.  She is scheduled for a C-scope 10/31  O Unable  A. Moderate persistent asthma with acute exacerbation  Acute cough  Flu- like symptoms  Exposure to the flu    P:Start:  Orders Placed This Encounter   Medications    montelukast (SINGULAIR) 10 MG tablet     Sig: Take 1 tablet by mouth daily     Dispense:  30 tablet     Refill:  3    brompheniramine-pseudoephedrine-DM 2-30-10 MG/5ML syrup     Sig: Take 10 mLs by mouth 3 times daily as needed for Congestion or Cough     Dispense:  150 mL     Refill:  0    oseltamivir (TAMIFLU) 75 MG capsule     Sig: Take 1 capsule by mouth 2 times daily for 5 days     Dispense:  10 capsule     Refill:  0   Continue albuterol. She plans to start her Prednisone  ADR's explained. Pt understands and would like to use the above medications. She will monitor her Bp and symptoms  Persist RTO or call. Worse call      11-20 minutes were spent on the digital evaluation and management of this patient.

## 2024-11-20 ENCOUNTER — HOSPITAL ENCOUNTER (OUTPATIENT)
Age: 64
Setting detail: SPECIMEN
Discharge: HOME | End: 2024-11-20

## 2024-11-25 LAB — SURGICAL PATHOLOGY REPORT: NORMAL

## 2024-12-12 LAB
CELIAC (HLA-DQ8): NORMAL
CELIAC (HLA-DQB1*02): NORMAL
CELIAC HLA-DQA1*05: NORMAL
INTERPRETATION: NORMAL
SPECIMEN: NORMAL

## 2025-01-01 DIAGNOSIS — I10 PRIMARY HYPERTENSION: ICD-10-CM

## 2025-01-02 RX ORDER — ATENOLOL 25 MG/1
25 TABLET ORAL DAILY
Qty: 30 TABLET | Refills: 0 | Status: SHIPPED | OUTPATIENT
Start: 2025-01-02

## 2025-02-02 DIAGNOSIS — E03.9 ACQUIRED HYPOTHYROIDISM: ICD-10-CM

## 2025-02-02 DIAGNOSIS — I10 PRIMARY HYPERTENSION: ICD-10-CM

## 2025-02-03 RX ORDER — LEVOTHYROXINE SODIUM 100 UG/1
TABLET ORAL
Qty: 30 TABLET | Refills: 0 | Status: SHIPPED | OUTPATIENT
Start: 2025-02-03 | End: 2025-02-04 | Stop reason: SDUPTHER

## 2025-02-03 RX ORDER — ATENOLOL 25 MG/1
25 TABLET ORAL DAILY
Qty: 30 TABLET | Refills: 0 | Status: SHIPPED | OUTPATIENT
Start: 2025-02-03 | End: 2025-02-04 | Stop reason: SDUPTHER

## 2025-02-04 ENCOUNTER — OFFICE VISIT (OUTPATIENT)
Dept: INTERNAL MEDICINE CLINIC | Age: 65
End: 2025-02-04
Payer: COMMERCIAL

## 2025-02-04 VITALS
OXYGEN SATURATION: 97 % | DIASTOLIC BLOOD PRESSURE: 78 MMHG | SYSTOLIC BLOOD PRESSURE: 126 MMHG | WEIGHT: 169 LBS | BODY MASS INDEX: 29.94 KG/M2 | HEART RATE: 64 BPM

## 2025-02-04 DIAGNOSIS — F41.9 ANXIETY: ICD-10-CM

## 2025-02-04 DIAGNOSIS — M19.91 PRIMARY OSTEOARTHRITIS, UNSPECIFIED SITE: ICD-10-CM

## 2025-02-04 DIAGNOSIS — N20.0 KIDNEY STONE: ICD-10-CM

## 2025-02-04 DIAGNOSIS — E78.5 HYPERLIPIDEMIA, UNSPECIFIED HYPERLIPIDEMIA TYPE: ICD-10-CM

## 2025-02-04 DIAGNOSIS — M41.9 SCOLIOSIS, UNSPECIFIED SCOLIOSIS TYPE, UNSPECIFIED SPINAL REGION: ICD-10-CM

## 2025-02-04 DIAGNOSIS — E03.9 ACQUIRED HYPOTHYROIDISM: ICD-10-CM

## 2025-02-04 DIAGNOSIS — J45.40 MODERATE PERSISTENT ASTHMA WITHOUT COMPLICATION: ICD-10-CM

## 2025-02-04 DIAGNOSIS — I10 PRIMARY HYPERTENSION: Primary | ICD-10-CM

## 2025-02-04 PROCEDURE — 3078F DIAST BP <80 MM HG: CPT | Performed by: FAMILY MEDICINE

## 2025-02-04 PROCEDURE — 3074F SYST BP LT 130 MM HG: CPT | Performed by: FAMILY MEDICINE

## 2025-02-04 PROCEDURE — 99214 OFFICE O/P EST MOD 30 MIN: CPT | Performed by: FAMILY MEDICINE

## 2025-02-04 RX ORDER — MELOXICAM 15 MG/1
TABLET ORAL
Qty: 90 TABLET | Refills: 1 | Status: SHIPPED | OUTPATIENT
Start: 2025-02-04

## 2025-02-04 RX ORDER — ATENOLOL 25 MG/1
25 TABLET ORAL DAILY
Qty: 90 TABLET | Refills: 1 | Status: SHIPPED | OUTPATIENT
Start: 2025-02-04

## 2025-02-04 RX ORDER — MONTELUKAST SODIUM 10 MG/1
10 TABLET ORAL DAILY
Qty: 90 TABLET | Refills: 1 | Status: SHIPPED | OUTPATIENT
Start: 2025-02-04

## 2025-02-04 RX ORDER — LEVOTHYROXINE SODIUM 100 UG/1
100 TABLET ORAL DAILY
Qty: 90 TABLET | Refills: 1 | Status: SHIPPED | OUTPATIENT
Start: 2025-02-04

## 2025-02-04 RX ORDER — ATORVASTATIN CALCIUM 20 MG/1
20 TABLET, FILM COATED ORAL DAILY
Qty: 90 TABLET | Refills: 1 | Status: SHIPPED | OUTPATIENT
Start: 2025-02-04

## 2025-02-04 RX ORDER — HYDROCHLOROTHIAZIDE 25 MG/1
25 TABLET ORAL DAILY
Qty: 90 TABLET | Refills: 1 | Status: SHIPPED | OUTPATIENT
Start: 2025-02-04

## 2025-02-04 SDOH — ECONOMIC STABILITY: INCOME INSECURITY: IN THE LAST 12 MONTHS, WAS THERE A TIME WHEN YOU WERE NOT ABLE TO PAY THE MORTGAGE OR RENT ON TIME?: NO

## 2025-02-04 SDOH — ECONOMIC STABILITY: FOOD INSECURITY: WITHIN THE PAST 12 MONTHS, YOU WORRIED THAT YOUR FOOD WOULD RUN OUT BEFORE YOU GOT MONEY TO BUY MORE.: NEVER TRUE

## 2025-02-04 SDOH — ECONOMIC STABILITY: TRANSPORTATION INSECURITY
IN THE PAST 12 MONTHS, HAS THE LACK OF TRANSPORTATION KEPT YOU FROM MEDICAL APPOINTMENTS OR FROM GETTING MEDICATIONS?: NO

## 2025-02-04 SDOH — ECONOMIC STABILITY: FOOD INSECURITY: WITHIN THE PAST 12 MONTHS, THE FOOD YOU BOUGHT JUST DIDN'T LAST AND YOU DIDN'T HAVE MONEY TO GET MORE.: NEVER TRUE

## 2025-02-04 SDOH — ECONOMIC STABILITY: TRANSPORTATION INSECURITY
IN THE PAST 12 MONTHS, HAS LACK OF TRANSPORTATION KEPT YOU FROM MEETINGS, WORK, OR FROM GETTING THINGS NEEDED FOR DAILY LIVING?: NO

## 2025-02-04 ASSESSMENT — PATIENT HEALTH QUESTIONNAIRE - PHQ9
2. FEELING DOWN, DEPRESSED OR HOPELESS: NOT AT ALL
2. FEELING DOWN, DEPRESSED OR HOPELESS: NOT AT ALL
1. LITTLE INTEREST OR PLEASURE IN DOING THINGS: NOT AT ALL
SUM OF ALL RESPONSES TO PHQ9 QUESTIONS 1 & 2: 0
SUM OF ALL RESPONSES TO PHQ9 QUESTIONS 1 & 2: 0
SUM OF ALL RESPONSES TO PHQ QUESTIONS 1-9: 0
1. LITTLE INTEREST OR PLEASURE IN DOING THINGS: NOT AT ALL
SUM OF ALL RESPONSES TO PHQ QUESTIONS 1-9: 0

## 2025-02-04 ASSESSMENT — ENCOUNTER SYMPTOMS
NAUSEA: 0
ABDOMINAL PAIN: 0
COUGH: 0
SHORTNESS OF BREATH: 0

## 2025-02-04 NOTE — PROGRESS NOTES
Praveena Rosales (:  1960) is a 64 y.o. female,established patient, here for evaluation of the following chief complaint(s):  Follow-up (5 mth f/u), Hypothyroidism, and Hypertension      Assessment & Plan   ASSESSMENT/PLAN:    Assessment & Plan  1. Primary hypertension.  Continue atenolol 25 mg and hydrochlorothiazide 25 mg.    2. Acquired hypothyroidism.  Continue levothyroxine 100 mcg tablet.    3. Anxiety.  Continue Prozac 20 mg capsule.    4. Mixed hyperlipidemia.  Continue Lipitor 20 mg tablet.    5. Kidney stone.  Keep follow-up with urology. She has declined intervention at this time.    6. Moderate persistent asthma without complication.  Continue Singulair 10 mg tablet and albuterol HFA.    7. Scoliosis, unspecified scoliosis type, unspecified spinal region.  Continue tizanidine 4 mg tablet.    8. Primary osteoarthritis, unspecified site.  Continue meloxicam 15 mg tablet.  ADR's explained      Orders Placed This Encounter   Medications    atorvastatin (LIPITOR) 20 MG tablet     Sig: Take 1 tablet by mouth daily     Dispense:  90 tablet     Refill:  1     D/C Lipitor 10    atenolol (TENORMIN) 25 MG tablet     Sig: Take 1 tablet by mouth daily     Dispense:  90 tablet     Refill:  1    FLUoxetine (PROZAC) 20 MG capsule     Sig: Take 1 capsule by mouth daily     Dispense:  90 capsule     Refill:  1    hydroCHLOROthiazide (HYDRODIURIL) 25 MG tablet     Sig: Take 1 tablet by mouth daily     Dispense:  90 tablet     Refill:  1    levothyroxine (SYNTHROID) 100 MCG tablet     Sig: Take 1 tablet by mouth Daily     Dispense:  90 tablet     Refill:  1    montelukast (SINGULAIR) 10 MG tablet     Sig: Take 1 tablet by mouth daily     Dispense:  90 tablet     Refill:  1    tiZANidine (ZANAFLEX) 4 MG tablet     Sig: TAKE ONE TABLET BY MOUTH AT NIGHT AS NEEDED FOR BACK PAIN     Dispense:  90 tablet     Refill:  1    meloxicam (MOBIC) 15 MG tablet     Sig: TAKE ONE TABLET BY MOUTH DAILY     Dispense:  90 tablet

## 2025-02-05 ENCOUNTER — HOSPITAL ENCOUNTER (OUTPATIENT)
Age: 65
Discharge: HOME | End: 2025-02-05
Payer: COMMERCIAL

## 2025-02-05 DIAGNOSIS — E78.5 HYPERLIPIDEMIA, UNSPECIFIED HYPERLIPIDEMIA TYPE: ICD-10-CM

## 2025-02-05 DIAGNOSIS — E03.9 ACQUIRED HYPOTHYROIDISM: ICD-10-CM

## 2025-02-05 DIAGNOSIS — R19.4 CHANGE IN BOWEL HABITS: ICD-10-CM

## 2025-02-05 DIAGNOSIS — K57.32 DIVERTICULITIS OF LARGE INTESTINE WITHOUT PERFORATION OR ABSCESS WITHOUT BLEEDING: ICD-10-CM

## 2025-02-05 DIAGNOSIS — I10 PRIMARY HYPERTENSION: ICD-10-CM

## 2025-02-05 LAB
ALBUMIN SERPL-MCNC: 4 G/DL (ref 3.4–5)
ALBUMIN/GLOB SERPL: 1.8 {RATIO} (ref 1.1–2.2)
ALP SERPL-CCNC: 115 U/L (ref 40–129)
ALT SERPL-CCNC: 16 U/L (ref 10–40)
ANION GAP SERPL CALCULATED.3IONS-SCNC: 10 MMOL/L (ref 9–17)
AST SERPL-CCNC: 29 U/L (ref 15–37)
BILIRUB SERPL-MCNC: 0.5 MG/DL (ref 0–1)
BUN SERPL-MCNC: 20 MG/DL (ref 7–20)
CALCIUM SERPL-MCNC: 9.5 MG/DL (ref 8.3–10.6)
CHLORIDE SERPL-SCNC: 105 MMOL/L (ref 99–110)
CHOLEST SERPL-MCNC: 160 MG/DL (ref 125–199)
CO2 SERPL-SCNC: 27 MMOL/L (ref 21–32)
CREAT SERPL-MCNC: 0.7 MG/DL (ref 0.6–1.2)
GFR, ESTIMATED: 80 ML/MIN/1.73M2
GLUCOSE SERPL-MCNC: 90 MG/DL (ref 74–99)
HDLC SERPL-MCNC: 64 MG/DL
IGA SERPL-MCNC: 95 MG/DL (ref 70–400)
LDLC SERPL CALC-MCNC: 80 MG/DL
POTASSIUM SERPL-SCNC: 4.3 MMOL/L (ref 3.5–5.1)
PROT SERPL-MCNC: 6.2 G/DL (ref 6.4–8.2)
SODIUM SERPL-SCNC: 141 MMOL/L (ref 136–145)
T4 FREE SERPL-MCNC: 1.2 NG/DL (ref 0.9–1.8)
TRIGL SERPL-MCNC: 80 MG/DL
TSH SERPL DL<=0.05 MIU/L-ACNC: 1.89 UIU/ML (ref 0.27–4.2)

## 2025-02-05 PROCEDURE — 82784 ASSAY IGA/IGD/IGG/IGM EACH: CPT

## 2025-02-05 PROCEDURE — 83516 IMMUNOASSAY NONANTIBODY: CPT

## 2025-02-05 PROCEDURE — 84439 ASSAY OF FREE THYROXINE: CPT

## 2025-02-05 PROCEDURE — 84443 ASSAY THYROID STIM HORMONE: CPT

## 2025-02-05 PROCEDURE — 80053 COMPREHEN METABOLIC PANEL: CPT

## 2025-02-05 PROCEDURE — 80061 LIPID PANEL: CPT

## 2025-02-07 LAB — TISSUE TRANSGLUTAMINASE AB, IGA: <0.5 U/ML (ref 0–14)

## 2025-02-08 PROBLEM — N20.0 KIDNEY STONE: Status: ACTIVE | Noted: 2025-02-08

## 2025-02-08 PROBLEM — M19.91 PRIMARY OSTEOARTHRITIS: Status: ACTIVE | Noted: 2025-02-08

## 2025-03-08 DIAGNOSIS — L30.9 HAND DERMATITIS: ICD-10-CM

## 2025-03-10 RX ORDER — MINOCYCLINE HYDROCHLORIDE 100 MG/1
CAPSULE ORAL DAILY
Qty: 30 CAPSULE | Refills: 0 | Status: SHIPPED | OUTPATIENT
Start: 2025-03-10 | End: 2025-04-09 | Stop reason: SDUPTHER

## 2025-03-19 ENCOUNTER — OFFICE VISIT (OUTPATIENT)
Dept: GASTROENTEROLOGY | Age: 65
End: 2025-03-19
Payer: COMMERCIAL

## 2025-03-19 VITALS
WEIGHT: 170.6 LBS | OXYGEN SATURATION: 95 % | RESPIRATION RATE: 16 BRPM | HEART RATE: 80 BPM | DIASTOLIC BLOOD PRESSURE: 80 MMHG | HEIGHT: 63 IN | BODY MASS INDEX: 30.23 KG/M2 | SYSTOLIC BLOOD PRESSURE: 126 MMHG

## 2025-03-19 DIAGNOSIS — D12.6 SESSILE SERRATED POLYP OF COLON: Primary | ICD-10-CM

## 2025-03-19 DIAGNOSIS — K21.9 GASTROESOPHAGEAL REFLUX DISEASE, UNSPECIFIED WHETHER ESOPHAGITIS PRESENT: ICD-10-CM

## 2025-03-19 DIAGNOSIS — K90.41 NCGS (NON-CELIAC GLUTEN SENSITIVITY): ICD-10-CM

## 2025-03-19 PROCEDURE — 3074F SYST BP LT 130 MM HG: CPT | Performed by: INTERNAL MEDICINE

## 2025-03-19 PROCEDURE — 3079F DIAST BP 80-89 MM HG: CPT | Performed by: INTERNAL MEDICINE

## 2025-03-19 PROCEDURE — 99213 OFFICE O/P EST LOW 20 MIN: CPT | Performed by: INTERNAL MEDICINE

## 2025-03-19 NOTE — PROGRESS NOTES
[facility]             MD Karol Long MD Carol Christensen, APRN-CNP       Sofi Wilson, APRN-CNP             [address] [place] [address]             [phone] fax [phone]        Gastroenterology Clinic Consultation    Karol Price MD  Encounter Date: 03/19/25     CC: Follow-up (Pt is here to discuss Celiac Genome Results and to f/u from c-scope )       No referring provider defined for this encounter.     History obtained from: patient,  medical records     Subjective:       Praveena Rosales is an 65 y.o. female with PMH of HTN, Anxiety who presents for Follow-up (Pt is here to discuss Celiac Genome Results and to f/u from c-scope )  3/19/2025  Since her last visit the patient had celiac testing done that was noted to be negative.  She also had a colonoscopy with me on 11/20/2024 revealed a 2 mm polyp in the cecum 3 mm polyp in the cecum 5 mm polyp in the transverse colon, diverticulosis and grade 2 hemorrhoids with pathology showing serrated polyps in the cecum.  Transverse polyp was noted to have colonic debris's only.  Repeat colonoscopy in 5 years.   Celiac disease panel is negative from 05/2024 was also negative.   She mentions that if she eats a lot of gluten she will get symptoms   She could have gluten sensitivity.     10/23/2024  She mentions she recently had an episode of diverticulitis. She had a CT done on 10/8/24 due to diarrhea and abdominal pain. She took antibiotics cipro and flagyl with improvement of her symptoms but still has diarrhea. She mentions she has had an episode of diverticulitis 20 years ago. Last colonoscopy 40 years ago.     Currently mentions intermittent GERD for which she takes tagamet PRN. No dysphagia, melena or hematochezia. Maternal Aunt had colon cancers. Mom had a large colon polyp and needed to have surgery to have it removed and unfortunately passed post surgery.

## 2025-03-26 ENCOUNTER — PATIENT MESSAGE (OUTPATIENT)
Dept: INTERNAL MEDICINE CLINIC | Age: 65
End: 2025-03-26

## 2025-03-26 RX ORDER — BROMPHENIRAMINE MALEATE, PSEUDOEPHEDRINE HYDROCHLORIDE, AND DEXTROMETHORPHAN HYDROBROMIDE 2; 30; 10 MG/5ML; MG/5ML; MG/5ML
5 SYRUP ORAL 3 TIMES DAILY PRN
Qty: 100 ML | Refills: 0 | Status: SHIPPED | OUTPATIENT
Start: 2025-03-26

## 2025-03-27 ENCOUNTER — OFFICE VISIT (OUTPATIENT)
Dept: FAMILY MEDICINE CLINIC | Age: 65
End: 2025-03-27
Payer: COMMERCIAL

## 2025-03-27 VITALS
SYSTOLIC BLOOD PRESSURE: 124 MMHG | HEART RATE: 78 BPM | TEMPERATURE: 97.7 F | BODY MASS INDEX: 30.12 KG/M2 | WEIGHT: 170 LBS | OXYGEN SATURATION: 96 % | DIASTOLIC BLOOD PRESSURE: 82 MMHG

## 2025-03-27 DIAGNOSIS — J40 BRONCHITIS: Primary | ICD-10-CM

## 2025-03-27 PROCEDURE — 99213 OFFICE O/P EST LOW 20 MIN: CPT | Performed by: NURSE PRACTITIONER

## 2025-03-27 PROCEDURE — 3074F SYST BP LT 130 MM HG: CPT | Performed by: NURSE PRACTITIONER

## 2025-03-27 PROCEDURE — 3079F DIAST BP 80-89 MM HG: CPT | Performed by: NURSE PRACTITIONER

## 2025-03-27 RX ORDER — METHYLPREDNISOLONE 4 MG/1
TABLET ORAL
Qty: 1 KIT | Refills: 0 | Status: SHIPPED | OUTPATIENT
Start: 2025-03-27 | End: 2025-04-02

## 2025-03-27 RX ORDER — DOXYCYCLINE HYCLATE 100 MG
100 TABLET ORAL 2 TIMES DAILY
Qty: 20 TABLET | Refills: 0 | Status: SHIPPED | OUTPATIENT
Start: 2025-03-27 | End: 2025-04-06

## 2025-03-27 NOTE — PROGRESS NOTES
Praveena Rosales   65 y.o.  female  [id number]      Chief Complaint   Patient presents with    Cold Symptoms     Diagnosed with bronchitis on 15th taken all meds-started getting better but on Tuesday got worse per pt         Subjective:  65 y.o.female is here for a follow up. She has the following chronic/acute medical problems:  Patient Active Problem List   Diagnosis    Scoliosis    Acquired hypothyroidism    Asthma    Anxiety    Hand dermatitis    Hypertension    Strain of left biceps    Rotator cuff tendonitis, left    Hyperlipidemia    Kidney stone    Primary osteoarthritis       HPI   History of Present Illness  The patient is a 65-year-old female who presents for evaluation of cold-like symptoms.    Her initial symptoms, which began a few days prior to 03/15/2025, included an earache and significant drainage, followed by the onset of a cough. She was diagnosed with bronchitis via a virtual visit on 03/15/2025. Despite feeling better after 5 days, with a reduction in mucus production and a change in color from yellow to clear, her condition worsened on Tuesday. She experienced a resurgence of yellow mucus production and severe coughing at night. She reports a slight decrease in appetite, but no associated weight loss. She had a fever of 101 degrees at the onset of her symptoms and experienced chills yesterday. Increased fatigue and poor sleep quality due to persistent coughing are reported. A sensation of pressure in the ear is noted, but no pain. Drainage in the back of the throat is not constant, but a runny nose and sinus pain or pressure are present. A sore throat occurred when drainage was most severe, but there has been no sneezing. Shortness of breath, wheezing, chest tightness, mild nausea, diarrhea, body aches, and headaches are reported. Coughing has been severe enough to cause urination in her pants and vomiting. The cough is believed to be due to postnasal drip. Attempts to seek care at the

## 2025-04-07 ENCOUNTER — PATIENT MESSAGE (OUTPATIENT)
Dept: INTERNAL MEDICINE CLINIC | Age: 65
End: 2025-04-07

## 2025-04-07 DIAGNOSIS — Z11.59 SCREENING FOR VIRAL DISEASE: Primary | ICD-10-CM

## 2025-04-09 DIAGNOSIS — E03.9 ACQUIRED HYPOTHYROIDISM: ICD-10-CM

## 2025-04-09 DIAGNOSIS — I10 PRIMARY HYPERTENSION: ICD-10-CM

## 2025-04-09 DIAGNOSIS — M19.91 PRIMARY OSTEOARTHRITIS, UNSPECIFIED SITE: ICD-10-CM

## 2025-04-09 DIAGNOSIS — J45.909 MODERATE ASTHMA WITHOUT COMPLICATION, UNSPECIFIED WHETHER PERSISTENT: ICD-10-CM

## 2025-04-09 DIAGNOSIS — M41.9 SCOLIOSIS, UNSPECIFIED SCOLIOSIS TYPE, UNSPECIFIED SPINAL REGION: ICD-10-CM

## 2025-04-09 DIAGNOSIS — J45.40 MODERATE PERSISTENT ASTHMA WITHOUT COMPLICATION: ICD-10-CM

## 2025-04-09 DIAGNOSIS — E78.5 HYPERLIPIDEMIA, UNSPECIFIED HYPERLIPIDEMIA TYPE: ICD-10-CM

## 2025-04-09 DIAGNOSIS — F41.9 ANXIETY: ICD-10-CM

## 2025-04-09 DIAGNOSIS — L30.9 HAND DERMATITIS: ICD-10-CM

## 2025-04-10 RX ORDER — ATENOLOL 25 MG/1
25 TABLET ORAL DAILY
Qty: 90 TABLET | Refills: 1 | Status: SHIPPED | OUTPATIENT
Start: 2025-04-10

## 2025-04-10 RX ORDER — HYDROCHLOROTHIAZIDE 25 MG/1
25 TABLET ORAL DAILY
Qty: 90 TABLET | Refills: 1 | Status: SHIPPED | OUTPATIENT
Start: 2025-04-10

## 2025-04-10 RX ORDER — MONTELUKAST SODIUM 10 MG/1
10 TABLET ORAL DAILY
Qty: 90 TABLET | Refills: 1 | Status: SHIPPED | OUTPATIENT
Start: 2025-04-10

## 2025-04-10 RX ORDER — LEVOTHYROXINE SODIUM 100 UG/1
100 TABLET ORAL DAILY
Qty: 90 TABLET | Refills: 1 | Status: SHIPPED | OUTPATIENT
Start: 2025-04-10

## 2025-04-10 RX ORDER — ALBUTEROL SULFATE 90 UG/1
2 INHALANT RESPIRATORY (INHALATION) EVERY 6 HOURS PRN
Qty: 1 EACH | Refills: 3 | Status: SHIPPED | OUTPATIENT
Start: 2025-04-10

## 2025-04-10 RX ORDER — ATORVASTATIN CALCIUM 20 MG/1
20 TABLET, FILM COATED ORAL DAILY
Qty: 90 TABLET | Refills: 1 | Status: SHIPPED | OUTPATIENT
Start: 2025-04-10

## 2025-04-10 RX ORDER — MELOXICAM 15 MG/1
TABLET ORAL
Qty: 90 TABLET | Refills: 1 | Status: SHIPPED | OUTPATIENT
Start: 2025-04-10

## 2025-04-10 RX ORDER — VALACYCLOVIR HYDROCHLORIDE 500 MG/1
500 TABLET, FILM COATED ORAL 2 TIMES DAILY
Qty: 30 TABLET | Refills: 0 | Status: SHIPPED | OUTPATIENT
Start: 2025-04-10

## 2025-04-10 RX ORDER — MINOCYCLINE HYDROCHLORIDE 100 MG/1
100 CAPSULE ORAL DAILY
Qty: 30 CAPSULE | Refills: 0 | Status: SHIPPED | OUTPATIENT
Start: 2025-04-10

## 2025-05-31 DIAGNOSIS — L30.9 HAND DERMATITIS: ICD-10-CM

## 2025-06-02 RX ORDER — MINOCYCLINE HYDROCHLORIDE 100 MG/1
CAPSULE ORAL DAILY
Qty: 30 CAPSULE | Refills: 0 | Status: SHIPPED | OUTPATIENT
Start: 2025-06-02

## 2025-06-07 ENCOUNTER — TELEMEDICINE ON DEMAND (OUTPATIENT)
Age: 65
End: 2025-06-07
Payer: COMMERCIAL

## 2025-06-07 DIAGNOSIS — R05.1 ACUTE COUGH: ICD-10-CM

## 2025-06-07 DIAGNOSIS — J40 BRONCHITIS: Primary | ICD-10-CM

## 2025-06-07 PROCEDURE — 99213 OFFICE O/P EST LOW 20 MIN: CPT | Performed by: NURSE PRACTITIONER

## 2025-06-07 RX ORDER — BROMPHENIRAMINE MALEATE, PSEUDOEPHEDRINE HYDROCHLORIDE, AND DEXTROMETHORPHAN HYDROBROMIDE 2; 30; 10 MG/5ML; MG/5ML; MG/5ML
5 SYRUP ORAL 4 TIMES DAILY PRN
Qty: 120 ML | Refills: 0 | Status: SHIPPED | OUTPATIENT
Start: 2025-06-07

## 2025-06-07 RX ORDER — DOXYCYCLINE HYCLATE 100 MG
100 TABLET ORAL 2 TIMES DAILY
Qty: 20 TABLET | Refills: 0 | Status: SHIPPED | OUTPATIENT
Start: 2025-06-07 | End: 2025-06-17

## 2025-06-07 RX ORDER — METHYLPREDNISOLONE 4 MG/1
TABLET ORAL
Qty: 1 KIT | Refills: 0 | Status: SHIPPED | OUTPATIENT
Start: 2025-06-07 | End: 2025-06-13

## 2025-06-07 ASSESSMENT — ENCOUNTER SYMPTOMS
COUGH: 1
WHEEZING: 1
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
## 2025-07-06 DIAGNOSIS — L30.9 HAND DERMATITIS: ICD-10-CM

## 2025-07-07 RX ORDER — VALACYCLOVIR HYDROCHLORIDE 500 MG/1
500 TABLET, FILM COATED ORAL 2 TIMES DAILY
Qty: 30 TABLET | Refills: 0 | Status: SHIPPED | OUTPATIENT
Start: 2025-07-07

## 2025-07-07 RX ORDER — MINOCYCLINE HYDROCHLORIDE 100 MG/1
CAPSULE ORAL DAILY
Qty: 30 CAPSULE | Refills: 0 | Status: SHIPPED | OUTPATIENT
Start: 2025-07-07

## 2025-07-11 ENCOUNTER — PATIENT MESSAGE (OUTPATIENT)
Dept: INTERNAL MEDICINE CLINIC | Age: 65
End: 2025-07-11

## 2025-07-11 DIAGNOSIS — N20.0 KIDNEY STONE: Primary | ICD-10-CM

## 2025-07-28 ENCOUNTER — TELEPHONE (OUTPATIENT)
Dept: INTERNAL MEDICINE CLINIC | Age: 65
End: 2025-07-28

## 2025-07-28 DIAGNOSIS — I10 PRIMARY HYPERTENSION: ICD-10-CM

## 2025-07-28 DIAGNOSIS — R73.03 PREDIABETES: ICD-10-CM

## 2025-07-28 DIAGNOSIS — E03.9 ACQUIRED HYPOTHYROIDISM: Primary | ICD-10-CM

## 2025-07-28 NOTE — TELEPHONE ENCOUNTER
Other labs ordered.- BMP, hemoglobin A1c, TSH, and Free T4- she can fast, but she does not have to  The rubeola was ordered because she wanted to know if she had antibodies to measles. This will likely not be covered by insurance

## 2025-07-28 NOTE — TELEPHONE ENCOUNTER
Patient wants to know if there is any other orders that she needs to be done besides the Rubeolla antibody IGG? Patient would like a call back.

## 2025-07-30 ENCOUNTER — HOSPITAL ENCOUNTER (OUTPATIENT)
Dept: LAB | Age: 65
Discharge: HOME | End: 2025-07-30
Payer: COMMERCIAL

## 2025-07-30 DIAGNOSIS — R73.03 PREDIABETES: ICD-10-CM

## 2025-07-30 DIAGNOSIS — I10 PRIMARY HYPERTENSION: ICD-10-CM

## 2025-07-30 DIAGNOSIS — E03.9 ACQUIRED HYPOTHYROIDISM: ICD-10-CM

## 2025-07-30 DIAGNOSIS — Z11.59 SCREENING FOR VIRAL DISEASE: ICD-10-CM

## 2025-07-30 LAB
ANION GAP SERPL CALCULATED.3IONS-SCNC: 14 MMOL/L (ref 9–17)
BUN SERPL-MCNC: 17 MG/DL (ref 7–20)
CALCIUM SERPL-MCNC: 9.7 MG/DL (ref 8.3–10.6)
CHLORIDE SERPL-SCNC: 98 MMOL/L (ref 99–110)
CO2 SERPL-SCNC: 27 MMOL/L (ref 21–32)
CREAT SERPL-MCNC: 0.7 MG/DL (ref 0.6–1.2)
EST. AVERAGE GLUCOSE BLD GHB EST-MCNC: 126 MG/DL
GFR, ESTIMATED: 83 ML/MIN/1.73M2
GLUCOSE SERPL-MCNC: 84 MG/DL (ref 74–99)
HBA1C MFR BLD: 6 % (ref 4.2–6.3)
POTASSIUM SERPL-SCNC: 4 MMOL/L (ref 3.5–5.1)
SODIUM SERPL-SCNC: 139 MMOL/L (ref 136–145)
T4 FREE SERPL-MCNC: 1.4 NG/DL (ref 0.9–1.8)
TSH SERPL DL<=0.05 MIU/L-ACNC: 1.85 UIU/ML (ref 0.27–4.2)

## 2025-07-30 PROCEDURE — 83036 HEMOGLOBIN GLYCOSYLATED A1C: CPT

## 2025-07-30 PROCEDURE — 84443 ASSAY THYROID STIM HORMONE: CPT

## 2025-07-30 PROCEDURE — 80048 BASIC METABOLIC PNL TOTAL CA: CPT

## 2025-07-30 PROCEDURE — 86765 RUBEOLA ANTIBODY: CPT

## 2025-07-30 PROCEDURE — 84439 ASSAY OF FREE THYROXINE: CPT

## 2025-08-01 LAB — MEASLES ANTIBODY IGG: NORMAL

## 2025-08-04 ENCOUNTER — PATIENT MESSAGE (OUTPATIENT)
Dept: INTERNAL MEDICINE CLINIC | Age: 65
End: 2025-08-04

## 2025-08-04 ENCOUNTER — OFFICE VISIT (OUTPATIENT)
Dept: INTERNAL MEDICINE CLINIC | Age: 65
End: 2025-08-04
Payer: COMMERCIAL

## 2025-08-04 VITALS
OXYGEN SATURATION: 97 % | BODY MASS INDEX: 30.83 KG/M2 | SYSTOLIC BLOOD PRESSURE: 138 MMHG | DIASTOLIC BLOOD PRESSURE: 78 MMHG | HEART RATE: 68 BPM | WEIGHT: 174 LBS

## 2025-08-04 DIAGNOSIS — I10 PRIMARY HYPERTENSION: ICD-10-CM

## 2025-08-04 DIAGNOSIS — E03.9 ACQUIRED HYPOTHYROIDISM: Primary | ICD-10-CM

## 2025-08-04 DIAGNOSIS — R73.03 PREDIABETES: ICD-10-CM

## 2025-08-04 DIAGNOSIS — L84 CORN OF TOE: ICD-10-CM

## 2025-08-04 DIAGNOSIS — T07.XXXA MULTIPLE EXCORIATIONS: ICD-10-CM

## 2025-08-04 DIAGNOSIS — E78.5 HYPERLIPIDEMIA, UNSPECIFIED HYPERLIPIDEMIA TYPE: ICD-10-CM

## 2025-08-04 DIAGNOSIS — J45.20 MILD INTERMITTENT ASTHMA WITHOUT COMPLICATION: ICD-10-CM

## 2025-08-04 DIAGNOSIS — L30.9 HAND DERMATITIS: ICD-10-CM

## 2025-08-04 DIAGNOSIS — F41.9 ANXIETY: ICD-10-CM

## 2025-08-04 PROCEDURE — 3075F SYST BP GE 130 - 139MM HG: CPT | Performed by: FAMILY MEDICINE

## 2025-08-04 PROCEDURE — 3078F DIAST BP <80 MM HG: CPT | Performed by: FAMILY MEDICINE

## 2025-08-04 PROCEDURE — 99214 OFFICE O/P EST MOD 30 MIN: CPT | Performed by: FAMILY MEDICINE

## 2025-08-04 RX ORDER — MUPIROCIN 2 %
OINTMENT (GRAM) TOPICAL
Qty: 30 G | Refills: 1 | Status: SHIPPED | OUTPATIENT
Start: 2025-08-04 | End: 2025-08-11

## 2025-08-04 ASSESSMENT — ENCOUNTER SYMPTOMS
SHORTNESS OF BREATH: 0
NAUSEA: 0
ABDOMINAL PAIN: 0
COUGH: 0

## 2025-08-07 DIAGNOSIS — L30.9 HAND DERMATITIS: ICD-10-CM

## 2025-08-07 RX ORDER — MINOCYCLINE HYDROCHLORIDE 100 MG/1
100 CAPSULE ORAL DAILY
Qty: 30 CAPSULE | Refills: 0 | Status: SHIPPED | OUTPATIENT
Start: 2025-08-07